# Patient Record
Sex: MALE | Race: OTHER | NOT HISPANIC OR LATINO
[De-identification: names, ages, dates, MRNs, and addresses within clinical notes are randomized per-mention and may not be internally consistent; named-entity substitution may affect disease eponyms.]

---

## 2018-07-10 ENCOUNTER — APPOINTMENT (OUTPATIENT)
Dept: OPHTHALMOLOGY | Facility: CLINIC | Age: 63
End: 2018-07-10
Payer: SELF-PAY

## 2018-07-10 PROBLEM — Z00.00 ENCOUNTER FOR PREVENTIVE HEALTH EXAMINATION: Status: ACTIVE | Noted: 2018-07-10

## 2018-07-10 PROCEDURE — 92002 INTRM OPH EXAM NEW PATIENT: CPT

## 2018-07-12 ENCOUNTER — APPOINTMENT (OUTPATIENT)
Dept: OPHTHALMOLOGY | Facility: CLINIC | Age: 63
End: 2018-07-12
Payer: SELF-PAY

## 2018-07-12 PROCEDURE — 92014 COMPRE OPH EXAM EST PT 1/>: CPT

## 2018-07-12 PROCEDURE — 92015 DETERMINE REFRACTIVE STATE: CPT

## 2018-09-06 ENCOUNTER — APPOINTMENT (OUTPATIENT)
Dept: OPHTHALMOLOGY | Facility: CLINIC | Age: 63
End: 2018-09-06
Payer: SELF-PAY

## 2018-09-06 PROCEDURE — 92015 DETERMINE REFRACTIVE STATE: CPT

## 2018-09-06 PROCEDURE — 92014 COMPRE OPH EXAM EST PT 1/>: CPT

## 2018-10-02 ENCOUNTER — APPOINTMENT (OUTPATIENT)
Dept: OPHTHALMOLOGY | Facility: CLINIC | Age: 63
End: 2018-10-02
Payer: COMMERCIAL

## 2018-10-02 PROCEDURE — 92014 COMPRE OPH EXAM EST PT 1/>: CPT

## 2018-10-02 PROCEDURE — 92025 CPTRIZED CORNEAL TOPOGRAPHY: CPT

## 2018-10-02 RX ORDER — VALSARTAN 40 MG/1
TABLET ORAL
Refills: 0 | Status: ACTIVE | COMMUNITY

## 2018-10-02 RX ORDER — MONTELUKAST SODIUM 10 MG/1
TABLET, FILM COATED ORAL
Refills: 0 | Status: ACTIVE | COMMUNITY

## 2018-10-22 ENCOUNTER — APPOINTMENT (OUTPATIENT)
Dept: OPHTHALMOLOGY | Facility: CLINIC | Age: 63
End: 2018-10-22
Payer: COMMERCIAL

## 2018-10-22 PROCEDURE — 92012 INTRM OPH EXAM EST PATIENT: CPT

## 2018-11-13 ENCOUNTER — MEDICATION RENEWAL (OUTPATIENT)
Age: 63
End: 2018-11-13

## 2018-11-19 ENCOUNTER — OUTPATIENT (OUTPATIENT)
Dept: OUTPATIENT SERVICES | Facility: HOSPITAL | Age: 63
LOS: 1 days | Discharge: HOME | End: 2018-11-19
Payer: COMMERCIAL

## 2018-11-19 ENCOUNTER — RESULT REVIEW (OUTPATIENT)
Age: 63
End: 2018-11-19

## 2018-11-19 ENCOUNTER — APPOINTMENT (OUTPATIENT)
Dept: OPHTHALMOLOGY | Facility: AMBULATORY SURGERY CENTER | Age: 63
End: 2018-11-19

## 2018-11-19 LAB — GLUCOSE BLDC GLUCOMTR-MCNC: 104 MG/DL — HIGH (ref 70–99)

## 2018-11-19 PROCEDURE — 65756 CORNEAL TRNSPL ENDOTHELIAL: CPT | Mod: RT

## 2018-11-19 PROCEDURE — 66984 XCAPSL CTRC RMVL W/O ECP: CPT | Mod: RT

## 2018-11-20 ENCOUNTER — APPOINTMENT (OUTPATIENT)
Dept: OPHTHALMOLOGY | Facility: CLINIC | Age: 63
End: 2018-11-20
Payer: COMMERCIAL

## 2018-11-20 LAB
GRAM STN FLD: SIGNIFICANT CHANGE UP
SPECIMEN SOURCE: SIGNIFICANT CHANGE UP

## 2018-11-20 PROCEDURE — 99024 POSTOP FOLLOW-UP VISIT: CPT

## 2018-11-22 LAB
CULTURE RESULTS: NO GROWTH — SIGNIFICANT CHANGE UP
SPECIMEN SOURCE: SIGNIFICANT CHANGE UP

## 2018-11-23 ENCOUNTER — APPOINTMENT (OUTPATIENT)
Dept: OPHTHALMOLOGY | Facility: CLINIC | Age: 63
End: 2018-11-23

## 2018-11-26 LAB — SURGICAL PATHOLOGY STUDY: SIGNIFICANT CHANGE UP

## 2018-11-28 ENCOUNTER — APPOINTMENT (OUTPATIENT)
Dept: OPHTHALMOLOGY | Facility: CLINIC | Age: 63
End: 2018-11-28
Payer: COMMERCIAL

## 2018-11-28 PROCEDURE — 99024 POSTOP FOLLOW-UP VISIT: CPT

## 2018-12-19 ENCOUNTER — APPOINTMENT (OUTPATIENT)
Dept: OPHTHALMOLOGY | Facility: CLINIC | Age: 63
End: 2018-12-19
Payer: COMMERCIAL

## 2018-12-19 PROCEDURE — 92136 OPHTHALMIC BIOMETRY: CPT | Mod: 26,LT

## 2018-12-19 PROCEDURE — 99024 POSTOP FOLLOW-UP VISIT: CPT

## 2019-01-29 ENCOUNTER — MEDICATION RENEWAL (OUTPATIENT)
Age: 64
End: 2019-01-29

## 2019-01-29 RX ORDER — MOXIFLOXACIN OPHTHALMIC 5 MG/ML
0.5 SOLUTION/ DROPS OPHTHALMIC 4 TIMES DAILY
Qty: 1 | Refills: 3 | Status: ACTIVE | COMMUNITY
Start: 2018-11-13 | End: 1900-01-01

## 2019-01-29 RX ORDER — MOXIFLOXACIN OPHTHALMIC 5 MG/ML
0.5 SOLUTION/ DROPS OPHTHALMIC 4 TIMES DAILY
Qty: 1 | Refills: 1 | Status: ACTIVE | COMMUNITY
Start: 2019-01-29 | End: 1900-01-01

## 2019-01-29 RX ORDER — DIFLUPREDNATE 0.5 MG/ML
0.05 EMULSION OPHTHALMIC
Qty: 1 | Refills: 6 | Status: ACTIVE | COMMUNITY
Start: 2019-01-29 | End: 1900-01-01

## 2019-01-29 RX ORDER — NEPAFENAC 3 MG/ML
0.3 SUSPENSION/ DROPS OPHTHALMIC DAILY
Qty: 1 | Refills: 3 | Status: ACTIVE | COMMUNITY
Start: 2019-01-29 | End: 1900-01-01

## 2019-01-29 RX ORDER — PREDNISOLONE ACETATE 10 MG/ML
1 SUSPENSION/ DROPS OPHTHALMIC 4 TIMES DAILY
Qty: 1 | Refills: 2 | Status: ACTIVE | COMMUNITY
Start: 2018-11-13 | End: 1900-01-01

## 2019-02-04 ENCOUNTER — RESULT REVIEW (OUTPATIENT)
Age: 64
End: 2019-02-04

## 2019-02-04 ENCOUNTER — OUTPATIENT (OUTPATIENT)
Dept: OUTPATIENT SERVICES | Facility: HOSPITAL | Age: 64
LOS: 1 days | Discharge: HOME | End: 2019-02-04
Payer: COMMERCIAL

## 2019-02-04 ENCOUNTER — APPOINTMENT (OUTPATIENT)
Dept: OPHTHALMOLOGY | Facility: AMBULATORY SURGERY CENTER | Age: 64
End: 2019-02-04

## 2019-02-04 LAB — GLUCOSE BLDC GLUCOMTR-MCNC: 142 MG/DL — HIGH (ref 70–99)

## 2019-02-04 PROCEDURE — 65756 CORNEAL TRNSPL ENDOTHELIAL: CPT | Mod: LT,79

## 2019-02-04 PROCEDURE — 66984 XCAPSL CTRC RMVL W/O ECP: CPT | Mod: LT,79

## 2019-02-05 ENCOUNTER — APPOINTMENT (OUTPATIENT)
Dept: OPHTHALMOLOGY | Facility: CLINIC | Age: 64
End: 2019-02-05
Payer: COMMERCIAL

## 2019-02-05 PROCEDURE — 99024 POSTOP FOLLOW-UP VISIT: CPT

## 2019-02-06 LAB
GRAM STN FLD: SIGNIFICANT CHANGE UP
SPECIMEN SOURCE: SIGNIFICANT CHANGE UP

## 2019-02-07 LAB
CULTURE RESULTS: NO GROWTH — SIGNIFICANT CHANGE UP
SPECIMEN SOURCE: SIGNIFICANT CHANGE UP

## 2019-02-08 LAB — SURGICAL PATHOLOGY STUDY: SIGNIFICANT CHANGE UP

## 2019-02-13 ENCOUNTER — APPOINTMENT (OUTPATIENT)
Dept: OPHTHALMOLOGY | Facility: CLINIC | Age: 64
End: 2019-02-13
Payer: COMMERCIAL

## 2019-02-13 PROCEDURE — 99024 POSTOP FOLLOW-UP VISIT: CPT

## 2019-02-14 ENCOUNTER — APPOINTMENT (OUTPATIENT)
Dept: OPHTHALMOLOGY | Facility: CLINIC | Age: 64
End: 2019-02-14

## 2019-02-20 ENCOUNTER — APPOINTMENT (OUTPATIENT)
Dept: OPHTHALMOLOGY | Facility: AMBULATORY SURGERY CENTER | Age: 64
End: 2019-02-20

## 2019-02-20 ENCOUNTER — OUTPATIENT (OUTPATIENT)
Dept: OUTPATIENT SERVICES | Facility: HOSPITAL | Age: 64
LOS: 1 days | Discharge: HOME | End: 2019-02-20
Payer: COMMERCIAL

## 2019-02-20 LAB — GLUCOSE BLDC GLUCOMTR-MCNC: 91 MG/DL — SIGNIFICANT CHANGE UP (ref 70–99)

## 2019-02-20 PROCEDURE — 66020 INJECTION TREATMENT OF EYE: CPT | Mod: LT,79

## 2019-02-21 ENCOUNTER — APPOINTMENT (OUTPATIENT)
Dept: OPHTHALMOLOGY | Facility: CLINIC | Age: 64
End: 2019-02-21
Payer: COMMERCIAL

## 2019-02-21 PROCEDURE — 99024 POSTOP FOLLOW-UP VISIT: CPT

## 2019-02-25 ENCOUNTER — APPOINTMENT (OUTPATIENT)
Dept: OPHTHALMOLOGY | Facility: CLINIC | Age: 64
End: 2019-02-25
Payer: COMMERCIAL

## 2019-02-25 DIAGNOSIS — H18.602 KERATOCONUS, UNSPECIFIED, LEFT EYE: ICD-10-CM

## 2019-02-25 PROCEDURE — 99024 POSTOP FOLLOW-UP VISIT: CPT

## 2019-02-27 PROBLEM — H18.602 KERATOCONUS OF LEFT EYE: Status: ACTIVE | Noted: 2018-07-12

## 2019-03-11 ENCOUNTER — APPOINTMENT (OUTPATIENT)
Dept: OPHTHALMOLOGY | Facility: CLINIC | Age: 64
End: 2019-03-11
Payer: COMMERCIAL

## 2019-03-11 DIAGNOSIS — H18.52 EPITHELIAL (JUVENILE) CORNEAL DYSTROPHY: ICD-10-CM

## 2019-03-11 DIAGNOSIS — H25.13 AGE-RELATED NUCLEAR CATARACT, BILATERAL: ICD-10-CM

## 2019-03-11 DIAGNOSIS — H18.603 KERATOCONUS, UNSPECIFIED, BILATERAL: ICD-10-CM

## 2019-03-11 DIAGNOSIS — H18.51 ENDOTHELIAL CORNEAL DYSTROPHY: ICD-10-CM

## 2019-03-11 PROCEDURE — 99024 POSTOP FOLLOW-UP VISIT: CPT

## 2021-01-11 ENCOUNTER — NON-APPOINTMENT (OUTPATIENT)
Age: 66
End: 2021-01-11

## 2021-01-11 ENCOUNTER — APPOINTMENT (OUTPATIENT)
Dept: OPHTHALMOLOGY | Facility: CLINIC | Age: 66
End: 2021-01-11
Payer: COMMERCIAL

## 2021-01-11 PROCEDURE — 99072 ADDL SUPL MATRL&STAF TM PHE: CPT

## 2021-01-11 PROCEDURE — 92014 COMPRE OPH EXAM EST PT 1/>: CPT

## 2021-01-11 PROCEDURE — 92025 CPTRIZED CORNEAL TOPOGRAPHY: CPT

## 2021-01-28 ENCOUNTER — APPOINTMENT (OUTPATIENT)
Dept: OPHTHALMOLOGY | Facility: CLINIC | Age: 66
End: 2021-01-28

## 2021-02-23 ENCOUNTER — TRANSCRIPTION ENCOUNTER (OUTPATIENT)
Age: 66
End: 2021-02-23

## 2021-02-24 ENCOUNTER — NON-APPOINTMENT (OUTPATIENT)
Age: 66
End: 2021-02-24

## 2021-02-24 ENCOUNTER — APPOINTMENT (OUTPATIENT)
Dept: OPHTHALMOLOGY | Facility: AMBULATORY SURGERY CENTER | Age: 66
End: 2021-02-24

## 2021-02-24 ENCOUNTER — RESULT REVIEW (OUTPATIENT)
Age: 66
End: 2021-02-24

## 2021-02-24 ENCOUNTER — OUTPATIENT (OUTPATIENT)
Dept: OUTPATIENT SERVICES | Facility: HOSPITAL | Age: 66
LOS: 1 days | Discharge: HOME | End: 2021-02-24
Payer: COMMERCIAL

## 2021-02-24 LAB — GLUCOSE BLDC GLUCOMTR-MCNC: 118 MG/DL — HIGH (ref 70–99)

## 2021-02-24 PROCEDURE — 88304 TISSUE EXAM BY PATHOLOGIST: CPT | Mod: 26

## 2021-02-24 PROCEDURE — 65756 CORNEAL TRNSPL ENDOTHELIAL: CPT | Mod: LT

## 2021-02-25 ENCOUNTER — APPOINTMENT (OUTPATIENT)
Dept: OPHTHALMOLOGY | Facility: CLINIC | Age: 66
End: 2021-02-25
Payer: COMMERCIAL

## 2021-02-25 ENCOUNTER — NON-APPOINTMENT (OUTPATIENT)
Age: 66
End: 2021-02-25

## 2021-02-25 LAB
GRAM STN FLD: SIGNIFICANT CHANGE UP
SPECIMEN SOURCE: SIGNIFICANT CHANGE UP

## 2021-02-25 PROCEDURE — 99024 POSTOP FOLLOW-UP VISIT: CPT

## 2021-03-01 ENCOUNTER — NON-APPOINTMENT (OUTPATIENT)
Age: 66
End: 2021-03-01

## 2021-03-01 ENCOUNTER — APPOINTMENT (OUTPATIENT)
Dept: OPHTHALMOLOGY | Facility: CLINIC | Age: 66
End: 2021-03-01
Payer: COMMERCIAL

## 2021-03-01 LAB — SURGICAL PATHOLOGY STUDY: SIGNIFICANT CHANGE UP

## 2021-03-01 PROCEDURE — 99024 POSTOP FOLLOW-UP VISIT: CPT

## 2021-03-02 LAB
CULTURE RESULTS: NO GROWTH — SIGNIFICANT CHANGE UP
SPECIMEN SOURCE: SIGNIFICANT CHANGE UP

## 2021-03-08 ENCOUNTER — NON-APPOINTMENT (OUTPATIENT)
Age: 66
End: 2021-03-08

## 2021-03-08 ENCOUNTER — APPOINTMENT (OUTPATIENT)
Dept: OPHTHALMOLOGY | Facility: CLINIC | Age: 66
End: 2021-03-08
Payer: COMMERCIAL

## 2021-03-08 PROCEDURE — 99024 POSTOP FOLLOW-UP VISIT: CPT

## 2021-04-12 ENCOUNTER — APPOINTMENT (OUTPATIENT)
Dept: OPHTHALMOLOGY | Facility: CLINIC | Age: 66
End: 2021-04-12
Payer: COMMERCIAL

## 2021-04-12 ENCOUNTER — NON-APPOINTMENT (OUTPATIENT)
Age: 66
End: 2021-04-12

## 2021-04-12 PROCEDURE — 99024 POSTOP FOLLOW-UP VISIT: CPT

## 2021-05-10 ENCOUNTER — APPOINTMENT (OUTPATIENT)
Dept: OPHTHALMOLOGY | Facility: CLINIC | Age: 66
End: 2021-05-10
Payer: COMMERCIAL

## 2021-05-10 ENCOUNTER — NON-APPOINTMENT (OUTPATIENT)
Age: 66
End: 2021-05-10

## 2021-05-10 PROCEDURE — 99024 POSTOP FOLLOW-UP VISIT: CPT

## 2021-06-14 ENCOUNTER — APPOINTMENT (OUTPATIENT)
Dept: OPHTHALMOLOGY | Facility: CLINIC | Age: 66
End: 2021-06-14
Payer: COMMERCIAL

## 2021-06-14 ENCOUNTER — NON-APPOINTMENT (OUTPATIENT)
Age: 66
End: 2021-06-14

## 2021-06-14 PROCEDURE — 92012 INTRM OPH EXAM EST PATIENT: CPT

## 2021-06-14 PROCEDURE — 99072 ADDL SUPL MATRL&STAF TM PHE: CPT

## 2021-07-01 ENCOUNTER — NON-APPOINTMENT (OUTPATIENT)
Age: 66
End: 2021-07-01

## 2021-07-01 ENCOUNTER — APPOINTMENT (OUTPATIENT)
Dept: OPHTHALMOLOGY | Facility: CLINIC | Age: 66
End: 2021-07-01
Payer: COMMERCIAL

## 2021-07-01 PROCEDURE — 92134 CPTRZ OPH DX IMG PST SGM RTA: CPT

## 2021-07-01 PROCEDURE — 92014 COMPRE OPH EXAM EST PT 1/>: CPT

## 2021-07-01 PROCEDURE — 99072 ADDL SUPL MATRL&STAF TM PHE: CPT

## 2021-08-04 ENCOUNTER — NON-APPOINTMENT (OUTPATIENT)
Age: 66
End: 2021-08-04

## 2021-08-04 ENCOUNTER — APPOINTMENT (OUTPATIENT)
Dept: OPHTHALMOLOGY | Facility: CLINIC | Age: 66
End: 2021-08-04
Payer: COMMERCIAL

## 2021-08-04 PROCEDURE — 92012 INTRM OPH EXAM EST PATIENT: CPT

## 2021-08-04 PROCEDURE — 92134 CPTRZ OPH DX IMG PST SGM RTA: CPT

## 2021-09-03 ENCOUNTER — APPOINTMENT (OUTPATIENT)
Dept: OPHTHALMOLOGY | Facility: CLINIC | Age: 66
End: 2021-09-03
Payer: COMMERCIAL

## 2021-09-03 ENCOUNTER — NON-APPOINTMENT (OUTPATIENT)
Age: 66
End: 2021-09-03

## 2021-09-03 PROCEDURE — 92134 CPTRZ OPH DX IMG PST SGM RTA: CPT

## 2021-09-03 PROCEDURE — 92012 INTRM OPH EXAM EST PATIENT: CPT

## 2021-10-04 ENCOUNTER — APPOINTMENT (OUTPATIENT)
Dept: OPHTHALMOLOGY | Facility: CLINIC | Age: 66
End: 2021-10-04
Payer: COMMERCIAL

## 2021-10-04 ENCOUNTER — NON-APPOINTMENT (OUTPATIENT)
Age: 66
End: 2021-10-04

## 2021-10-04 PROCEDURE — 92012 INTRM OPH EXAM EST PATIENT: CPT

## 2021-10-04 PROCEDURE — 92134 CPTRZ OPH DX IMG PST SGM RTA: CPT

## 2021-11-08 ENCOUNTER — APPOINTMENT (OUTPATIENT)
Dept: OPHTHALMOLOGY | Facility: CLINIC | Age: 66
End: 2021-11-08
Payer: COMMERCIAL

## 2021-11-08 ENCOUNTER — NON-APPOINTMENT (OUTPATIENT)
Age: 66
End: 2021-11-08

## 2021-11-08 PROCEDURE — 92025 CPTRIZED CORNEAL TOPOGRAPHY: CPT

## 2021-11-08 PROCEDURE — 92012 INTRM OPH EXAM EST PATIENT: CPT

## 2021-12-01 ENCOUNTER — NON-APPOINTMENT (OUTPATIENT)
Age: 66
End: 2021-12-01

## 2021-12-01 ENCOUNTER — APPOINTMENT (OUTPATIENT)
Dept: OPHTHALMOLOGY | Facility: CLINIC | Age: 66
End: 2021-12-01
Payer: COMMERCIAL

## 2021-12-01 PROCEDURE — 92134 CPTRZ OPH DX IMG PST SGM RTA: CPT

## 2021-12-01 PROCEDURE — 92012 INTRM OPH EXAM EST PATIENT: CPT

## 2022-01-19 ENCOUNTER — APPOINTMENT (OUTPATIENT)
Dept: OPHTHALMOLOGY | Facility: CLINIC | Age: 67
End: 2022-01-19
Payer: COMMERCIAL

## 2022-01-19 ENCOUNTER — NON-APPOINTMENT (OUTPATIENT)
Age: 67
End: 2022-01-19

## 2022-01-19 PROCEDURE — 92134 CPTRZ OPH DX IMG PST SGM RTA: CPT

## 2022-01-19 PROCEDURE — 92012 INTRM OPH EXAM EST PATIENT: CPT

## 2022-02-09 ENCOUNTER — NON-APPOINTMENT (OUTPATIENT)
Age: 67
End: 2022-02-09

## 2022-02-09 ENCOUNTER — APPOINTMENT (OUTPATIENT)
Dept: OPHTHALMOLOGY | Facility: CLINIC | Age: 67
End: 2022-02-09
Payer: COMMERCIAL

## 2022-02-09 PROCEDURE — 92012 INTRM OPH EXAM EST PATIENT: CPT

## 2022-04-20 ENCOUNTER — NON-APPOINTMENT (OUTPATIENT)
Age: 67
End: 2022-04-20

## 2022-04-20 ENCOUNTER — APPOINTMENT (OUTPATIENT)
Dept: OPHTHALMOLOGY | Facility: CLINIC | Age: 67
End: 2022-04-20
Payer: COMMERCIAL

## 2022-04-20 PROCEDURE — 92134 CPTRZ OPH DX IMG PST SGM RTA: CPT

## 2022-04-20 PROCEDURE — 92012 INTRM OPH EXAM EST PATIENT: CPT

## 2025-03-18 ENCOUNTER — EMERGENCY (EMERGENCY)
Facility: HOSPITAL | Age: 70
LOS: 1 days | Discharge: HOME | End: 2025-03-18
Attending: STUDENT IN AN ORGANIZED HEALTH CARE EDUCATION/TRAINING PROGRAM | Admitting: STUDENT IN AN ORGANIZED HEALTH CARE EDUCATION/TRAINING PROGRAM
Payer: COMMERCIAL

## 2025-03-18 VITALS
RESPIRATION RATE: 18 BRPM | SYSTOLIC BLOOD PRESSURE: 101 MMHG | OXYGEN SATURATION: 95 % | HEART RATE: 100 BPM | TEMPERATURE: 98 F | DIASTOLIC BLOOD PRESSURE: 63 MMHG

## 2025-03-18 VITALS
OXYGEN SATURATION: 96 % | WEIGHT: 199.08 LBS | SYSTOLIC BLOOD PRESSURE: 112 MMHG | TEMPERATURE: 98 F | DIASTOLIC BLOOD PRESSURE: 57 MMHG | HEIGHT: 67 IN | RESPIRATION RATE: 18 BRPM | HEART RATE: 110 BPM

## 2025-03-18 LAB
ADD ON TEST-SPECIMEN IN LAB: SIGNIFICANT CHANGE UP
ADD ON TEST-SPECIMEN IN LAB: SIGNIFICANT CHANGE UP
ALBUMIN SERPL ELPH-MCNC: 2.7 G/DL — LOW (ref 3.3–5)
ALBUMIN SERPL ELPH-MCNC: 3 G/DL — LOW (ref 3.3–5)
ALP SERPL-CCNC: 57 U/L — SIGNIFICANT CHANGE UP (ref 40–120)
ALP SERPL-CCNC: SIGNIFICANT CHANGE UP (ref 40–120)
ALT FLD-CCNC: 18 U/L — SIGNIFICANT CHANGE UP (ref 10–45)
ALT FLD-CCNC: SIGNIFICANT CHANGE UP (ref 10–45)
ANION GAP SERPL CALC-SCNC: 14 MMOL/L — SIGNIFICANT CHANGE UP (ref 5–17)
ANION GAP SERPL CALC-SCNC: 16 MMOL/L — SIGNIFICANT CHANGE UP (ref 5–17)
AST SERPL-CCNC: 16 U/L — SIGNIFICANT CHANGE UP (ref 10–40)
AST SERPL-CCNC: SIGNIFICANT CHANGE UP (ref 10–40)
BILIRUB SERPL-MCNC: 1 MG/DL — SIGNIFICANT CHANGE UP (ref 0.2–1.2)
BILIRUB SERPL-MCNC: 1 MG/DL — SIGNIFICANT CHANGE UP (ref 0.2–1.2)
BUN SERPL-MCNC: 16 MG/DL — SIGNIFICANT CHANGE UP (ref 7–23)
BUN SERPL-MCNC: 17 MG/DL — SIGNIFICANT CHANGE UP (ref 7–23)
C DIFF GDH STL QL: NEGATIVE — SIGNIFICANT CHANGE UP
C DIFF GDH STL QL: SIGNIFICANT CHANGE UP
CALCIUM SERPL-MCNC: 8.2 MG/DL — LOW (ref 8.4–10.5)
CALCIUM SERPL-MCNC: 8.3 MG/DL — LOW (ref 8.4–10.5)
CHLORIDE SERPL-SCNC: 102 MMOL/L — SIGNIFICANT CHANGE UP (ref 96–108)
CHLORIDE SERPL-SCNC: 98 MMOL/L — SIGNIFICANT CHANGE UP (ref 96–108)
CO2 SERPL-SCNC: 17 MMOL/L — LOW (ref 22–31)
CO2 SERPL-SCNC: 19 MMOL/L — LOW (ref 22–31)
CREAT SERPL-MCNC: 1.31 MG/DL — HIGH (ref 0.5–1.3)
CREAT SERPL-MCNC: 1.39 MG/DL — HIGH (ref 0.5–1.3)
EGFR: 54 ML/MIN/1.73M2 — LOW
EGFR: 54 ML/MIN/1.73M2 — LOW
EGFR: 58 ML/MIN/1.73M2 — LOW
EGFR: 58 ML/MIN/1.73M2 — LOW
GLUCOSE SERPL-MCNC: 87 MG/DL — SIGNIFICANT CHANGE UP (ref 70–99)
GLUCOSE SERPL-MCNC: 99 MG/DL — SIGNIFICANT CHANGE UP (ref 70–99)
HCT VFR BLD CALC: 48.4 % — SIGNIFICANT CHANGE UP (ref 39–50)
HGB BLD-MCNC: 17.7 G/DL — HIGH (ref 13–17)
LIDOCAIN IGE QN: 20 U/L — SIGNIFICANT CHANGE UP (ref 7–60)
MCHC RBC-ENTMCNC: 31.4 PG — SIGNIFICANT CHANGE UP (ref 27–34)
MCHC RBC-ENTMCNC: 36.6 G/DL — HIGH (ref 32–36)
MCV RBC AUTO: 85.8 FL — SIGNIFICANT CHANGE UP (ref 80–100)
NRBC BLD AUTO-RTO: 0 /100 WBCS — SIGNIFICANT CHANGE UP (ref 0–0)
PLATELET # BLD AUTO: 359 K/UL — SIGNIFICANT CHANGE UP (ref 150–400)
POTASSIUM SERPL-MCNC: 3 MMOL/L — LOW (ref 3.5–5.3)
POTASSIUM SERPL-MCNC: SIGNIFICANT CHANGE UP (ref 3.5–5.3)
POTASSIUM SERPL-SCNC: 3 MMOL/L — LOW (ref 3.5–5.3)
POTASSIUM SERPL-SCNC: SIGNIFICANT CHANGE UP (ref 3.5–5.3)
PROT SERPL-MCNC: 6.2 G/DL — SIGNIFICANT CHANGE UP (ref 6–8.3)
PROT SERPL-MCNC: 7.2 G/DL — SIGNIFICANT CHANGE UP (ref 6–8.3)
RBC # BLD: 5.64 M/UL — SIGNIFICANT CHANGE UP (ref 4.2–5.8)
RBC # FLD: 13.4 % — SIGNIFICANT CHANGE UP (ref 10.3–14.5)
SODIUM SERPL-SCNC: 131 MMOL/L — LOW (ref 135–145)
SODIUM SERPL-SCNC: 135 MMOL/L — SIGNIFICANT CHANGE UP (ref 135–145)
WBC # BLD: 19.24 K/UL — HIGH (ref 3.8–10.5)
WBC # FLD AUTO: 19.24 K/UL — HIGH (ref 3.8–10.5)

## 2025-03-18 PROCEDURE — 83690 ASSAY OF LIPASE: CPT

## 2025-03-18 PROCEDURE — 96374 THER/PROPH/DIAG INJ IV PUSH: CPT | Mod: XU

## 2025-03-18 PROCEDURE — 74177 CT ABD & PELVIS W/CONTRAST: CPT | Mod: 26

## 2025-03-18 PROCEDURE — 83735 ASSAY OF MAGNESIUM: CPT

## 2025-03-18 PROCEDURE — 36415 COLL VENOUS BLD VENIPUNCTURE: CPT

## 2025-03-18 PROCEDURE — 87449 NOS EACH ORGANISM AG IA: CPT

## 2025-03-18 PROCEDURE — 87507 IADNA-DNA/RNA PROBE TQ 12-25: CPT

## 2025-03-18 PROCEDURE — 74177 CT ABD & PELVIS W/CONTRAST: CPT | Mod: MC

## 2025-03-18 PROCEDURE — 80053 COMPREHEN METABOLIC PANEL: CPT

## 2025-03-18 PROCEDURE — 99284 EMERGENCY DEPT VISIT MOD MDM: CPT | Mod: 25

## 2025-03-18 PROCEDURE — 85027 COMPLETE CBC AUTOMATED: CPT

## 2025-03-18 PROCEDURE — 99285 EMERGENCY DEPT VISIT HI MDM: CPT

## 2025-03-18 PROCEDURE — 84100 ASSAY OF PHOSPHORUS: CPT

## 2025-03-18 PROCEDURE — 87324 CLOSTRIDIUM AG IA: CPT

## 2025-03-18 RX ORDER — OXYCODONE HYDROCHLORIDE AND ACETAMINOPHEN 10; 325 MG/1; MG/1
1 TABLET ORAL
Qty: 9 | Refills: 0
Start: 2025-03-18 | End: 2025-03-20

## 2025-03-18 RX ORDER — AMOXICILLIN AND CLAVULANATE POTASSIUM 500; 125 MG/1; MG/1
1 TABLET, FILM COATED ORAL ONCE
Refills: 0 | Status: COMPLETED | OUTPATIENT
Start: 2025-03-18 | End: 2025-03-18

## 2025-03-18 RX ORDER — IOHEXOL 350 MG/ML
30 INJECTION, SOLUTION INTRAVENOUS ONCE
Refills: 0 | Status: COMPLETED | OUTPATIENT
Start: 2025-03-18 | End: 2025-03-18

## 2025-03-18 RX ORDER — ACETAMINOPHEN 500 MG/5ML
650 LIQUID (ML) ORAL ONCE
Refills: 0 | Status: COMPLETED | OUTPATIENT
Start: 2025-03-18 | End: 2025-03-18

## 2025-03-18 RX ORDER — AMOXICILLIN AND CLAVULANATE POTASSIUM 500; 125 MG/1; MG/1
1 TABLET, FILM COATED ORAL
Qty: 14 | Refills: 0
Start: 2025-03-18 | End: 2025-03-24

## 2025-03-18 RX ORDER — SODIUM CHLORIDE 9 G/1000ML
1000 INJECTION, SOLUTION INTRAVENOUS ONCE
Refills: 0 | Status: COMPLETED | OUTPATIENT
Start: 2025-03-18 | End: 2025-03-18

## 2025-03-18 RX ORDER — POTASSIUM CHLORIDE, DEXTROSE MONOHYDRATE AND SODIUM CHLORIDE 150; 5; 900 MG/100ML; G/100ML; MG/100ML
1010 INJECTION, SOLUTION INTRAVENOUS
Refills: 0 | Status: COMPLETED | OUTPATIENT
Start: 2025-03-18 | End: 2025-03-18

## 2025-03-18 RX ADMIN — Medication 650 MILLIGRAM(S): at 23:07

## 2025-03-18 RX ADMIN — Medication 4 MILLIGRAM(S): at 23:08

## 2025-03-18 RX ADMIN — POTASSIUM CHLORIDE, DEXTROSE MONOHYDRATE AND SODIUM CHLORIDE 1000 MILLILITER(S): 150; 5; 900 INJECTION, SOLUTION INTRAVENOUS at 22:22

## 2025-03-18 RX ADMIN — SODIUM CHLORIDE 1000 MILLILITER(S): 9 INJECTION, SOLUTION INTRAVENOUS at 17:23

## 2025-03-18 RX ADMIN — Medication 40 MILLIEQUIVALENT(S): at 22:08

## 2025-03-18 RX ADMIN — AMOXICILLIN AND CLAVULANATE POTASSIUM 1 TABLET(S): 500; 125 TABLET, FILM COATED ORAL at 23:07

## 2025-03-18 RX ADMIN — SODIUM CHLORIDE 1000 MILLILITER(S): 9 INJECTION, SOLUTION INTRAVENOUS at 18:50

## 2025-03-18 RX ADMIN — IOHEXOL 30 MILLILITER(S): 350 INJECTION, SOLUTION INTRAVENOUS at 17:23

## 2025-03-18 NOTE — ED PROVIDER NOTE - NS ED ROS FT
CONSTITUTIONAL: No fever, no chills, no fatigue  EYES: No eye redness, no visual changes  ENT: No ear pain, no sore throat  CARDIOVASCULAR: No chest pain, no palpitations  RESPIRATORY: No cough, no SOB  GI: No abdominal pain, no nausea, no vomiting, no constipation, +diarrhea  GENITOURINARY: No dysuria, no frequency, no hematuria  MUSCULOSKELETAL: No back pain, no joint pain, no myalgias  SKIN: No rash, no peripheral edema  NEURO: No headache, no confusion    ALL OTHER SYSTEMS NEGATIVE Doxycycline Counseling:  Patient counseled regarding possible photosensitivity and increased risk for sunburn.  Patient instructed to avoid sunlight, if possible.  When exposed to sunlight, patients should wear protective clothing, sunglasses, and sunscreen.  The patient was instructed to call the office immediately if the following severe adverse effects occur:  hearing changes, easy bruising/bleeding, severe headache, or vision changes.  The patient verbalized understanding of the proper use and possible adverse effects of doxycycline.  All of the patient's questions and concerns were addressed.

## 2025-03-18 NOTE — ED PROVIDER NOTE - CLINICAL SUMMARY MEDICAL DECISION MAKING FREE TEXT BOX
Diarrhea for 2 wks. No fever, abx use, sick contacts or travel.  Now mildly bloody, likely 2/2 rectal irritation from frequent BMs.  No large vol blood.  Will eval labs, perform CT r/o functional cause of diarrhea.  GI PCR.  IVF rehydration.    Pt overall well appearing and likely will be able to go home if w/u unremarkable.

## 2025-03-18 NOTE — ED PROVIDER NOTE - PROGRESS NOTE DETAILS
WBC 19. CT results show significant inflammatory vs infectious colitis of entire colon.  I spoke to Dr. Early on the phone who states pt did not have c.diff performed as outpt study.  Will collect stool sample and perform.  Plan for possible abx for c.diff vs other infectious colitis.  Pt has colonoscopy scheduled for 1 week from now with Dr. Early.

## 2025-03-18 NOTE — ED PROVIDER NOTE - PATIENT PORTAL LINK FT
You can access the FollowMyHealth Patient Portal offered by [facility] by registering at the following website: [email or web address]. By joining [facility]’s FollowMyHealth portal, you will also be able to view your health information using other applications (apps) compatible with our system.

## 2025-03-18 NOTE — ED PROVIDER NOTE - PHYSICAL EXAMINATION
CONSTITUTIONAL: Non-toxic; in no apparent distress  HEAD: Normocephalic; atraumatic  EYES: PERRL; EOM intact   ENMT: External appears normal  NECK: Supple; non-tender  CARD: Normal S1, S2; no murmurs, rubs, or gallops  RESP: Normal chest excursion with respiration; breath sounds clear and equal bilaterally  ABD: Soft, non-distended; non-tender, no guarding, no rebound, no CVAT  EXT: Normal ROM in all four extremities; non-tender to palpation, no peripheral edema  SKIN: Warm, dry, no rash  NEURO:  No focal neurological deficiencies

## 2025-03-18 NOTE — ED PROVIDER NOTE - CARE PROVIDER_API CALL
Satish Early  Gastroenterology  [address], [address]  [place] [address]  Phone: [phone]  Fax: [phone]  Follow Up Time: 4-6 Days

## 2025-03-18 NOTE — ED PROVIDER NOTE - NSFOLLOWUPINSTRUCTIONS_ED_ALL_ED_FT
Follow up with your primary medical doctor as soon as possible.    Return to the emergency department if your symptoms worsen or if you develop new symptoms.  If you have any problems with followup, please call the ED Referral Coordinator at [phone].    Diarrhea    Diarrhea is frequent loose or watery bowel movements that has many causes. Diarrhea can make you feel weak and cause you to become dehydrated. Diarrhea typically lasts 2–3 days, but can last longer if it is a sign of something more serious. Drink clear fluids to prevent dehydration. Eat bland, easy-to-digest foods as tolerated.     SEEK IMMEDIATE MEDICAL CARE IF YOU HAVE ANY OF THE FOLLOWING SYMPTOMS: high fevers, lightheadedness/dizziness, chest pain, black or bloody stools, shortness of breath, severe abdominal or back pain, or any signs of dehydration. Follow up with your primary medical doctor as soon as possible.    Follow up with Dr. Early as soon as possible. Call [phone] to schedule an appointment.    Return to the emergency department if your symptoms worsen or if you develop new symptoms.  If you have any problems with followup, please call the ED Referral Coordinator at [phone].    Take medication for pain: Tylenol 650 mg every 6 hours. If Tylenol is not controlling pain, take 1 tab of percocet every 8 hours as needed.    Diarrhea    Diarrhea is frequent loose or watery bowel movements that has many causes. Diarrhea can make you feel weak and cause you to become dehydrated. Diarrhea typically lasts 2–3 days, but can last longer if it is a sign of something more serious. Drink clear fluids to prevent dehydration. Eat bland, easy-to-digest foods as tolerated.     SEEK IMMEDIATE MEDICAL CARE IF YOU HAVE ANY OF THE FOLLOWING SYMPTOMS: high fevers, lightheadedness/dizziness, chest pain, black or bloody stools, shortness of breath, severe abdominal or back pain, or any signs of dehydration.

## 2025-03-18 NOTE — ED PROVIDER NOTE - OBJECTIVE STATEMENT
70 yo M w PMH of DM2, p/w watery diarrhea for 2 weeks. He was seen by his GI physician who recommended he come into the ED. No recent travel or Abx use. He had a GI PCR yesterday that resulted negative. His GI is planning to perform a colonoscopy but wanted him to come in to ED for c/f dehydration. Recently, he has had scant blood in his stool and rectal discomfort with BMs.

## 2025-03-18 NOTE — ED ADULT NURSE NOTE - OBJECTIVE STATEMENT
Patient to the ED c/o blood diarrhea x 3 weeks. Denies abdominal pain, n/v, fevers or recent travel. No recent abx use. Recently had labs and stool sample done for pre-op r/t ortho surgery, stool negative. Hx of HTN and DM, on mounjaro. AAOX4, NAD.

## 2025-03-19 LAB
ADV 40+41 DNA STL QL NAA+NON-PROBE: SIGNIFICANT CHANGE UP
ASTROVIRUS: SIGNIFICANT CHANGE UP
C CAYETANENSIS DNA STL QL NAA+NON-PROBE: SIGNIFICANT CHANGE UP
CAMPYLOBACTER DNA SPEC NAA+PROBE: SIGNIFICANT CHANGE UP
CRYPTOSP DNA STL QL NAA+PROBE: SIGNIFICANT CHANGE UP
E COLI SXT SPEC: SIGNIFICANT CHANGE UP
E HISTOLYT DNA STL QL NAA+NON-PROBE: SIGNIFICANT CHANGE UP
EC EAEA GENE STL QL NAA+PROBE: SIGNIFICANT CHANGE UP
EPEC DNA STL QL NAA+PROBE: SIGNIFICANT CHANGE UP
ETEC DNA STL QL NAA+PROBE: SIGNIFICANT CHANGE UP
G LAMBLIA DNA STL QL NAA+NON-PROBE: SIGNIFICANT CHANGE UP
GI PCR PANEL: SIGNIFICANT CHANGE UP
NOROVIRUS GI+II RNA STL QL NAA+NON-PROBE: SIGNIFICANT CHANGE UP
P SHIGELLOIDES DNA STL QL NAA+NON-PROBE: SIGNIFICANT CHANGE UP
RV RNA STL NAA+PROBE: SIGNIFICANT CHANGE UP
SALMONELLA DNA STL QL NAA+PROBE: SIGNIFICANT CHANGE UP
SAPOVIRUS (GENOGROUPS I, II, IV, AND V): SIGNIFICANT CHANGE UP
SHIGELLA DNA SPEC QL NAA+PROBE: SIGNIFICANT CHANGE UP
VIBRIO CHOL TOXIN CTXA STL QL NAA+PROBE: SIGNIFICANT CHANGE UP
VIBRIO CHOL TOXIN CTXA STL QL NAA+PROBE: SIGNIFICANT CHANGE UP
Y ENTEROCOL DNA STL QL NAA+NON-PROBE: SIGNIFICANT CHANGE UP

## 2025-03-21 ENCOUNTER — INPATIENT (INPATIENT)
Facility: HOSPITAL | Age: 70
LOS: 2 days | Discharge: HOME | End: 2025-03-24
Attending: STUDENT IN AN ORGANIZED HEALTH CARE EDUCATION/TRAINING PROGRAM | Admitting: STUDENT IN AN ORGANIZED HEALTH CARE EDUCATION/TRAINING PROGRAM
Payer: COMMERCIAL

## 2025-03-21 VITALS
HEIGHT: 67 IN | WEIGHT: 199.96 LBS | OXYGEN SATURATION: 96 % | HEART RATE: 110 BPM | DIASTOLIC BLOOD PRESSURE: 46 MMHG | TEMPERATURE: 99 F | SYSTOLIC BLOOD PRESSURE: 83 MMHG | RESPIRATION RATE: 18 BRPM

## 2025-03-21 DIAGNOSIS — E11.9 TYPE 2 DIABETES MELLITUS WITHOUT COMPLICATIONS: ICD-10-CM

## 2025-03-21 DIAGNOSIS — R19.7 DIARRHEA, UNSPECIFIED: ICD-10-CM

## 2025-03-21 LAB
ADD ON TEST-SPECIMEN IN LAB: SIGNIFICANT CHANGE UP
ALBUMIN SERPL ELPH-MCNC: 2.6 G/DL — LOW (ref 3.3–5)
ALP SERPL-CCNC: 71 U/L — SIGNIFICANT CHANGE UP (ref 40–120)
ALT FLD-CCNC: 15 U/L — SIGNIFICANT CHANGE UP (ref 10–45)
ANION GAP SERPL CALC-SCNC: 10 MMOL/L — SIGNIFICANT CHANGE UP (ref 5–17)
ANION GAP SERPL CALC-SCNC: 11 MMOL/L — SIGNIFICANT CHANGE UP (ref 5–17)
ANION GAP SERPL CALC-SCNC: 13 MMOL/L — SIGNIFICANT CHANGE UP (ref 5–17)
APPEARANCE UR: CLEAR — SIGNIFICANT CHANGE UP
APTT BLD: 26.7 SEC — SIGNIFICANT CHANGE UP (ref 24.5–35.6)
APTT BLD: 27.6 SEC — SIGNIFICANT CHANGE UP (ref 24.5–35.6)
AST SERPL-CCNC: 13 U/L — SIGNIFICANT CHANGE UP (ref 10–40)
BASOPHILS # BLD AUTO: 0 K/UL — SIGNIFICANT CHANGE UP (ref 0–0.2)
BASOPHILS NFR BLD AUTO: 0 % — SIGNIFICANT CHANGE UP (ref 0–2)
BILIRUB SERPL-MCNC: 1 MG/DL — SIGNIFICANT CHANGE UP (ref 0.2–1.2)
BILIRUB UR-MCNC: NEGATIVE — SIGNIFICANT CHANGE UP
BUN SERPL-MCNC: 21 MG/DL — SIGNIFICANT CHANGE UP (ref 7–23)
BUN SERPL-MCNC: 22 MG/DL — SIGNIFICANT CHANGE UP (ref 7–23)
BUN SERPL-MCNC: 22 MG/DL — SIGNIFICANT CHANGE UP (ref 7–23)
CALCIUM SERPL-MCNC: 7.2 MG/DL — LOW (ref 8.4–10.5)
CALCIUM SERPL-MCNC: 7.4 MG/DL — LOW (ref 8.4–10.5)
CALCIUM SERPL-MCNC: 8.3 MG/DL — LOW (ref 8.4–10.5)
CHLORIDE SERPL-SCNC: 100 MMOL/L — SIGNIFICANT CHANGE UP (ref 96–108)
CHLORIDE SERPL-SCNC: 104 MMOL/L — SIGNIFICANT CHANGE UP (ref 96–108)
CHLORIDE SERPL-SCNC: 106 MMOL/L — SIGNIFICANT CHANGE UP (ref 96–108)
CO2 SERPL-SCNC: 16 MMOL/L — LOW (ref 22–31)
CO2 SERPL-SCNC: 16 MMOL/L — LOW (ref 22–31)
CO2 SERPL-SCNC: 20 MMOL/L — LOW (ref 22–31)
COLOR SPEC: SIGNIFICANT CHANGE UP
CREAT SERPL-MCNC: 1.48 MG/DL — HIGH (ref 0.5–1.3)
CREAT SERPL-MCNC: 1.53 MG/DL — HIGH (ref 0.5–1.3)
CREAT SERPL-MCNC: 1.8 MG/DL — HIGH (ref 0.5–1.3)
DIFF PNL FLD: ABNORMAL
EGFR: 40 ML/MIN/1.73M2 — LOW
EGFR: 40 ML/MIN/1.73M2 — LOW
EGFR: 48 ML/MIN/1.73M2 — LOW
EGFR: 48 ML/MIN/1.73M2 — LOW
EGFR: 50 ML/MIN/1.73M2 — LOW
EGFR: 50 ML/MIN/1.73M2 — LOW
EOSINOPHIL # BLD AUTO: 0 K/UL — SIGNIFICANT CHANGE UP (ref 0–0.5)
EOSINOPHIL NFR BLD AUTO: 0 % — SIGNIFICANT CHANGE UP (ref 0–6)
FLUAV AG NPH QL: SIGNIFICANT CHANGE UP
FLUBV AG NPH QL: SIGNIFICANT CHANGE UP
GLUCOSE SERPL-MCNC: 137 MG/DL — HIGH (ref 70–99)
GLUCOSE SERPL-MCNC: 88 MG/DL — SIGNIFICANT CHANGE UP (ref 70–99)
GLUCOSE SERPL-MCNC: 98 MG/DL — SIGNIFICANT CHANGE UP (ref 70–99)
GLUCOSE UR QL: NEGATIVE MG/DL — SIGNIFICANT CHANGE UP
HCT VFR BLD CALC: 32.6 % — LOW (ref 39–50)
HCT VFR BLD CALC: SIGNIFICANT CHANGE UP % (ref 39–50)
HGB BLD-MCNC: 12.1 G/DL — LOW (ref 13–17)
HGB BLD-MCNC: 14.6 G/DL — SIGNIFICANT CHANGE UP (ref 13–17)
INR BLD: 1.5 — HIGH (ref 0.85–1.16)
INR BLD: 1.52 — HIGH (ref 0.85–1.16)
KETONES UR-MCNC: ABNORMAL MG/DL
LACTATE SERPL-SCNC: 1.4 MMOL/L — SIGNIFICANT CHANGE UP (ref 0.5–2)
LACTATE SERPL-SCNC: 1.6 MMOL/L — SIGNIFICANT CHANGE UP (ref 0.5–2)
LEUKOCYTE ESTERASE UR-ACNC: NEGATIVE — SIGNIFICANT CHANGE UP
LYMPHOCYTES # BLD AUTO: 1.19 K/UL — SIGNIFICANT CHANGE UP (ref 1–3.3)
LYMPHOCYTES # BLD AUTO: 5.5 % — LOW (ref 13–44)
MAGNESIUM SERPL-MCNC: 1.6 MG/DL — SIGNIFICANT CHANGE UP (ref 1.6–2.6)
MCHC RBC-ENTMCNC: 31.3 PG — SIGNIFICANT CHANGE UP (ref 27–34)
MCHC RBC-ENTMCNC: 37.1 G/DL — HIGH (ref 32–36)
MCHC RBC-ENTMCNC: SIGNIFICANT CHANGE UP G/DL (ref 32–36)
MCHC RBC-ENTMCNC: SIGNIFICANT CHANGE UP PG (ref 27–34)
MCV RBC AUTO: 84.5 FL — SIGNIFICANT CHANGE UP (ref 80–100)
MCV RBC AUTO: SIGNIFICANT CHANGE UP FL (ref 80–100)
MONOCYTES # BLD AUTO: 2.62 K/UL — HIGH (ref 0–0.9)
MONOCYTES NFR BLD AUTO: 12.1 % — SIGNIFICANT CHANGE UP (ref 2–14)
NEUTROPHILS # BLD AUTO: 17.4 K/UL — HIGH (ref 1.8–7.4)
NEUTROPHILS NFR BLD AUTO: 78 % — HIGH (ref 43–77)
NITRITE UR-MCNC: NEGATIVE — SIGNIFICANT CHANGE UP
NRBC BLD AUTO-RTO: 0 /100 WBCS — SIGNIFICANT CHANGE UP (ref 0–0)
PH UR: 5.5 — SIGNIFICANT CHANGE UP (ref 5–8)
PHOSPHATE SERPL-MCNC: 1.6 MG/DL — LOW (ref 2.5–4.5)
PLATELET # BLD AUTO: 339 K/UL — SIGNIFICANT CHANGE UP (ref 150–400)
PLATELET # BLD AUTO: 422 K/UL — HIGH (ref 150–400)
POTASSIUM SERPL-MCNC: 2.8 MMOL/L — CRITICAL LOW (ref 3.5–5.3)
POTASSIUM SERPL-MCNC: 3 MMOL/L — LOW (ref 3.5–5.3)
POTASSIUM SERPL-MCNC: 3.2 MMOL/L — LOW (ref 3.5–5.3)
POTASSIUM SERPL-SCNC: 2.8 MMOL/L — CRITICAL LOW (ref 3.5–5.3)
POTASSIUM SERPL-SCNC: 3 MMOL/L — LOW (ref 3.5–5.3)
POTASSIUM SERPL-SCNC: 3.2 MMOL/L — LOW (ref 3.5–5.3)
PROT SERPL-MCNC: 6.2 G/DL — SIGNIFICANT CHANGE UP (ref 6–8.3)
PROT UR-MCNC: 100 MG/DL
PROTHROM AB SERPL-ACNC: 17.2 SEC — HIGH (ref 9.9–13.4)
PROTHROM AB SERPL-ACNC: 17.4 SEC — HIGH (ref 9.9–13.4)
RBC # BLD: 3.86 M/UL — LOW (ref 4.2–5.8)
RBC # BLD: SIGNIFICANT CHANGE UP M/UL (ref 4.2–5.8)
RBC # FLD: 14.5 % — SIGNIFICANT CHANGE UP (ref 10.3–14.5)
RBC # FLD: SIGNIFICANT CHANGE UP % (ref 10.3–14.5)
RSV RNA NPH QL NAA+NON-PROBE: SIGNIFICANT CHANGE UP
SARS-COV-2 RNA SPEC QL NAA+PROBE: SIGNIFICANT CHANGE UP
SODIUM SERPL-SCNC: 131 MMOL/L — LOW (ref 135–145)
SODIUM SERPL-SCNC: 132 MMOL/L — LOW (ref 135–145)
SODIUM SERPL-SCNC: 133 MMOL/L — LOW (ref 135–145)
SOURCE RESPIRATORY: SIGNIFICANT CHANGE UP
SP GR SPEC: 1.02 — SIGNIFICANT CHANGE UP (ref 1–1.03)
UROBILINOGEN FLD QL: 1 MG/DL — SIGNIFICANT CHANGE UP (ref 0.2–1)
WBC # BLD: 19.86 K/UL — HIGH (ref 3.8–10.5)
WBC # BLD: 21.69 K/UL — HIGH (ref 3.8–10.5)
WBC # FLD AUTO: 19.86 K/UL — HIGH (ref 3.8–10.5)
WBC # FLD AUTO: 21.69 K/UL — HIGH (ref 3.8–10.5)

## 2025-03-21 PROCEDURE — 74176 CT ABD & PELVIS W/O CONTRAST: CPT | Mod: 26,59

## 2025-03-21 PROCEDURE — 99291 CRITICAL CARE FIRST HOUR: CPT | Mod: GC

## 2025-03-21 PROCEDURE — 93010 ELECTROCARDIOGRAM REPORT: CPT

## 2025-03-21 PROCEDURE — 99223 1ST HOSP IP/OBS HIGH 75: CPT

## 2025-03-21 PROCEDURE — 74174 CTA ABD&PLVS W/CONTRAST: CPT | Mod: 26

## 2025-03-21 PROCEDURE — 99291 CRITICAL CARE FIRST HOUR: CPT

## 2025-03-21 RX ORDER — TAMSULOSIN HYDROCHLORIDE 0.4 MG/1
1 CAPSULE ORAL
Refills: 0 | DISCHARGE

## 2025-03-21 RX ORDER — IOHEXOL 350 MG/ML
30 INJECTION, SOLUTION INTRAVENOUS ONCE
Refills: 0 | Status: COMPLETED | OUTPATIENT
Start: 2025-03-21 | End: 2025-03-21

## 2025-03-21 RX ORDER — SODIUM CHLORIDE 9 G/1000ML
1000 INJECTION, SOLUTION INTRAVENOUS
Refills: 0 | Status: DISCONTINUED | OUTPATIENT
Start: 2025-03-21 | End: 2025-03-22

## 2025-03-21 RX ORDER — KETOROLAC TROMETHAMINE 30 MG/ML
15 INJECTION, SOLUTION INTRAMUSCULAR; INTRAVENOUS ONCE
Refills: 0 | Status: DISCONTINUED | OUTPATIENT
Start: 2025-03-21 | End: 2025-03-21

## 2025-03-21 RX ORDER — TIRZEPATIDE 7.5 MG/.5ML
15 INJECTION, SOLUTION SUBCUTANEOUS
Refills: 0 | DISCHARGE

## 2025-03-21 RX ORDER — VANCOMYCIN HCL IN 5 % DEXTROSE 1.5G/250ML
125 PLASTIC BAG, INJECTION (ML) INTRAVENOUS ONCE
Refills: 0 | Status: COMPLETED | OUTPATIENT
Start: 2025-03-21 | End: 2025-03-21

## 2025-03-21 RX ORDER — OLMESARTAN MEDOXOMIL 5 MG/1
2 TABLET, FILM COATED ORAL
Refills: 0 | DISCHARGE

## 2025-03-21 RX ORDER — HEPARIN SODIUM 1000 [USP'U]/ML
5000 INJECTION INTRAVENOUS; SUBCUTANEOUS EVERY 8 HOURS
Refills: 0 | Status: DISCONTINUED | OUTPATIENT
Start: 2025-03-21 | End: 2025-03-22

## 2025-03-21 RX ORDER — CEFTRIAXONE 500 MG/1
2000 INJECTION, POWDER, FOR SOLUTION INTRAMUSCULAR; INTRAVENOUS ONCE
Refills: 0 | Status: COMPLETED | OUTPATIENT
Start: 2025-03-21 | End: 2025-03-21

## 2025-03-21 RX ORDER — TESTOSTERONE 5.5 MG/1
60 GEL NASAL
Refills: 0 | DISCHARGE

## 2025-03-21 RX ORDER — DEXTROSE 50 % IN WATER 50 %
12.5 SYRINGE (ML) INTRAVENOUS ONCE
Refills: 0 | Status: DISCONTINUED | OUTPATIENT
Start: 2025-03-21 | End: 2025-03-22

## 2025-03-21 RX ORDER — ANASTROZOLE 1 MG/1
0.5 TABLET ORAL
Refills: 0 | DISCHARGE

## 2025-03-21 RX ORDER — METRONIDAZOLE 250 MG
500 TABLET ORAL EVERY 8 HOURS
Refills: 0 | Status: DISCONTINUED | OUTPATIENT
Start: 2025-03-21 | End: 2025-03-22

## 2025-03-21 RX ORDER — INSULIN LISPRO 100 U/ML
INJECTION, SOLUTION INTRAVENOUS; SUBCUTANEOUS
Refills: 0 | Status: DISCONTINUED | OUTPATIENT
Start: 2025-03-21 | End: 2025-03-24

## 2025-03-21 RX ORDER — DEXTROSE 50 % IN WATER 50 %
25 SYRINGE (ML) INTRAVENOUS ONCE
Refills: 0 | Status: DISCONTINUED | OUTPATIENT
Start: 2025-03-21 | End: 2025-03-22

## 2025-03-21 RX ORDER — ROSUVASTATIN CALCIUM 5 MG/1
5 TABLET, FILM COATED ORAL
Refills: 0 | DISCHARGE

## 2025-03-21 RX ORDER — SODIUM CHLORIDE 9 G/1000ML
1000 INJECTION, SOLUTION INTRAVENOUS
Refills: 0 | Status: DISCONTINUED | OUTPATIENT
Start: 2025-03-21 | End: 2025-03-23

## 2025-03-21 RX ORDER — ONDANSETRON HCL/PF 4 MG/2 ML
4 VIAL (ML) INJECTION EVERY 6 HOURS
Refills: 0 | Status: DISCONTINUED | OUTPATIENT
Start: 2025-03-21 | End: 2025-03-24

## 2025-03-21 RX ORDER — VANCOMYCIN HCL IN 5 % DEXTROSE 1.5G/250ML
125 PLASTIC BAG, INJECTION (ML) INTRAVENOUS EVERY 6 HOURS
Refills: 0 | Status: DISCONTINUED | OUTPATIENT
Start: 2025-03-21 | End: 2025-03-22

## 2025-03-21 RX ORDER — SODIUM CHLORIDE 9 G/1000ML
1000 INJECTION, SOLUTION INTRAVENOUS
Refills: 0 | Status: DISCONTINUED | OUTPATIENT
Start: 2025-03-21 | End: 2025-03-24

## 2025-03-21 RX ORDER — DEXTROSE 50 % IN WATER 50 %
15 SYRINGE (ML) INTRAVENOUS ONCE
Refills: 0 | Status: DISCONTINUED | OUTPATIENT
Start: 2025-03-21 | End: 2025-03-22

## 2025-03-21 RX ORDER — ONDANSETRON HCL/PF 4 MG/2 ML
4 VIAL (ML) INJECTION ONCE
Refills: 0 | Status: COMPLETED | OUTPATIENT
Start: 2025-03-21 | End: 2025-03-21

## 2025-03-21 RX ORDER — GLUCAGON 3 MG/1
1 POWDER NASAL ONCE
Refills: 0 | Status: DISCONTINUED | OUTPATIENT
Start: 2025-03-21 | End: 2025-03-22

## 2025-03-21 RX ORDER — DEXTROSE 50 % IN WATER 50 %
25 SYRINGE (ML) INTRAVENOUS ONCE
Refills: 0 | Status: DISCONTINUED | OUTPATIENT
Start: 2025-03-21 | End: 2025-03-24

## 2025-03-21 RX ORDER — METRONIDAZOLE 250 MG
500 TABLET ORAL ONCE
Refills: 0 | Status: COMPLETED | OUTPATIENT
Start: 2025-03-21 | End: 2025-03-21

## 2025-03-21 RX ADMIN — Medication 1000 MILLILITER(S): at 14:12

## 2025-03-21 RX ADMIN — Medication 10 MILLIEQUIVALENT(S): at 18:12

## 2025-03-21 RX ADMIN — Medication 2000 MILLILITER(S): at 13:39

## 2025-03-21 RX ADMIN — Medication 500 MILLIGRAM(S): at 16:12

## 2025-03-21 RX ADMIN — KETOROLAC TROMETHAMINE 15 MILLIGRAM(S): 30 INJECTION, SOLUTION INTRAMUSCULAR; INTRAVENOUS at 12:46

## 2025-03-21 RX ADMIN — Medication 125 MILLIGRAM(S): at 23:04

## 2025-03-21 RX ADMIN — Medication 1000 MILLILITER(S): at 15:41

## 2025-03-21 RX ADMIN — Medication 40 MILLIEQUIVALENT(S): at 17:50

## 2025-03-21 RX ADMIN — Medication 4 MILLIGRAM(S): at 12:36

## 2025-03-21 RX ADMIN — Medication 1000 MILLILITER(S): at 17:50

## 2025-03-21 RX ADMIN — Medication 1000 MILLILITER(S): at 12:43

## 2025-03-21 RX ADMIN — Medication 1000 MILLILITER(S): at 14:13

## 2025-03-21 RX ADMIN — Medication 20 MILLIGRAM(S): at 12:40

## 2025-03-21 RX ADMIN — Medication 100 MILLIGRAM(S): at 22:20

## 2025-03-21 RX ADMIN — Medication 100 MILLIEQUIVALENT(S): at 15:40

## 2025-03-21 RX ADMIN — IOHEXOL 30 MILLILITER(S): 350 INJECTION, SOLUTION INTRAVENOUS at 12:46

## 2025-03-21 RX ADMIN — Medication 2000 MILLILITER(S): at 12:19

## 2025-03-21 RX ADMIN — Medication 100 MILLIEQUIVALENT(S): at 13:29

## 2025-03-21 RX ADMIN — Medication 10 MILLIEQUIVALENT(S): at 14:49

## 2025-03-21 RX ADMIN — Medication 100 MILLIGRAM(S): at 15:40

## 2025-03-21 RX ADMIN — Medication 40 MILLIEQUIVALENT(S): at 13:31

## 2025-03-21 RX ADMIN — CEFTRIAXONE 100 MILLIGRAM(S): 500 INJECTION, POWDER, FOR SOLUTION INTRAMUSCULAR; INTRAVENOUS at 16:12

## 2025-03-21 RX ADMIN — SODIUM CHLORIDE 120 MILLILITER(S): 9 INJECTION, SOLUTION INTRAVENOUS at 20:22

## 2025-03-21 RX ADMIN — HEPARIN SODIUM 5000 UNIT(S): 1000 INJECTION INTRAVENOUS; SUBCUTANEOUS at 22:20

## 2025-03-21 RX ADMIN — Medication 125 MILLIGRAM(S): at 15:41

## 2025-03-21 RX ADMIN — KETOROLAC TROMETHAMINE 15 MILLIGRAM(S): 30 INJECTION, SOLUTION INTRAMUSCULAR; INTRAVENOUS at 12:36

## 2025-03-21 RX ADMIN — Medication 100 MILLIEQUIVALENT(S): at 14:24

## 2025-03-21 RX ADMIN — CEFTRIAXONE 2000 MILLIGRAM(S): 500 INJECTION, POWDER, FOR SOLUTION INTRAMUSCULAR; INTRAVENOUS at 16:28

## 2025-03-21 NOTE — CONSULT NOTE ADULT - ASSESSMENT
Patient is a 70 y/o M with a PMH of HTN, DM (2/2 prednisone use) and a PSH of b/l cataract repair, b/l corneal transplant, L knee meniscectomy complicated by infection, varicectomy last seen in the ED on 3/18 for a 2 week history of explosive, voluminous, watery diarrhea with lab results significan for WBC 19, CT with pancolitis, negative GI PCR, negative C. difficile, chemistries, and creatinine 1.39. Patient reports multiple episodes of diarrhea-watery, no bloody this morning and last night. Patient began to feel some dizziness, weakness, photophobia sensation and fatigue so came to the ER for evaluation. Vascular surgery was consulted for the possible SMV thrombosis seen on CT AP. Patient was initially hypotensive (84/50) but responded to 1L bolus of NS 4x, and BP is now 115/67, however patient is tachycardic to 119. CT AP showing worsening colitis at the descending and proximal sigmoid colon, persistent colitis at the cecum and proximal ascending colon, questionable hypodensity at the superior mesenteric vein at the level of the aortic bifurcation that could correspond to a filling defect on prior study. CTA AP revealed interval worsening in severe pancolitis with possible developing toxic megacolon, the transverse colon measuring 7-8 cm in diameter. Findings compatible with infectious etiology although inflammatory etiology is possible. The degree of involvement is not indicative of ischemic colitis. No SMA or SMV thrombosis was seen.  Patient is a 68 y/o M with a PMH of HTN, DM (2/2 prednisone use) and a PSH of b/l cataract repair, b/l corneal transplant, L knee meniscectomy complicated by infection, varicectomy last seen in the ED on 3/18 for a 2 week history of explosive, voluminous, watery diarrhea with lab results significan for WBC 19, CT with pancolitis, negative GI PCR, negative C. difficile, chemistries, and creatinine 1.39. Patient reports multiple episodes of diarrhea-watery, no bloody this morning and last night. Patient began to feel some dizziness, weakness, photophobia sensation and fatigue so came to the ER for evaluation. Vascular surgery was consulted for the possible SMV thrombosis seen on CT AP. Patient was initially hypotensive (84/50) but responded to 1L bolus of NS 4x, and BP is now 115/67, however patient is tachycardic to 119. CT AP showing worsening colitis at the descending and proximal sigmoid colon, persistent colitis at the cecum and proximal ascending colon, questionable hypodensity at the superior mesenteric vein at the level of the aortic bifurcation that could correspond to a filling defect on prior study. CTA AP revealed interval worsening in severe pancolitis with possible developing toxic megacolon, the transverse colon measuring 7-8 cm in diameter. Findings compatible with infectious etiology although inflammatory etiology is possible. The degree of involvement is not indicative of ischemic colitis. No SMA or SMV thrombosis was seen.     No acute vascular intervention  Discussed with attending Dr. Connors

## 2025-03-21 NOTE — ED ADULT NURSE NOTE - NSFALLRISKINTERV_ED_ALL_ED

## 2025-03-21 NOTE — CONSULT NOTE ADULT - ATTENDING COMMENTS
Patient presenting with worsening abdomen pain and significant diarrhea. CT scan showing distended colon concerning for infectious source with WBC count up to 20k. Was found to be cdiff negative previously and currently being treated for intra-abdominal sources and will cover for severe Cdiff. Clinically still appears dry so will give further volume and wean vasopressors as able. Case discussed with GI and plan for flex sig in the am.

## 2025-03-21 NOTE — ED PROVIDER NOTE - OBJECTIVE STATEMENT
70 yo M h/o dm, htn, hypothyroid, seen 3/18 for c/o 2 wk watery diarrhea  with lab evaluation notable for elevated white count of 19, CT with pancolitis, negative GI PCR, negative C. difficile, chemistries with elevated creatinine 1.3 days.  Patient was discharged with Percocet and Augmentin and reports relief of right lower quadrant pain he  started having 3/18 (present during last ED evaluation), last dose earlier this morning.  Patient reports multiple episodes of diarrhea-watery, no blood-6 episodes this a.m., 12 episodes since yesterday evening.  Patient began to feel some dizziness, weakness, photophobia sensation and fatigue so came to the ER for evaluation.  Patient reports subjective fever sensation.  No chest pain, palpitations, shortness of breath, URI symptoms, cough, dysuria.  Patient notes decreased urine output.  Patient reports nausea, no vomiting. + burping

## 2025-03-21 NOTE — ED ADULT NURSE NOTE - ED STAT RN HANDOFF DETAILS
Patient transported safely via stretcher, on a cardiac monitor accompanied by RN and 5 [facility] [place] resident and transport team.

## 2025-03-21 NOTE — ED PROVIDER NOTE - PROGRESS NOTE DETAILS
Pt discussed w Dr Early. Labs notable for nl lactate, high wbc increased from prior but unable to run mcv, etc; hgb/plt wnl.  Chem notable for kwaku w cr 1.8, slightly low na 131 low k 2.8 likely 2/2 severe diarrhea.  Mag nl.  Pt given K repletion.  BP improving w ivf.  K improving on rpt; additional po k ordered.  Cr also improving w ivf.  Bladder scan w 66 cc - no concern for obstruction; suspect kwaku all from dehydration 2/2 diarrhea  Pt given empiric abx including po vanco despite neg cdiff 2/2 clinical picture suggesting gi etiology, high wbc and hypotension.  CT w worsened colitis and concern for smv occlusion.  CTA ordered and pending read.  Vasc consulted.  Additional ivf ordered for bp ~ .  Pt w likely admit.  Pt signed out to Dr Freeman. Vasc reviewed ct and did not see occlusion/ischemia, eval pt and not concerned for vasc issue.  GI consulted and initial plan to admit to med w GI following but CTA, although neg for smv/sma issue,  read indicates possible toxic megacolon.  Surg consulted and will eval.  Pt signed out to Dr Freeman. Labs notable for nl lactate, high wbc increased from prior but unable to run mcv, etc; hgb/plt wnl.  Chem notable for kwaku w cr 1.8, slightly low na 131 low k 2.8 likely 2/2 severe diarrhea.  Mag nl.  Pt given K repletion.  BP improving w ivf.  K improving on rpt; additional po k ordered.  Cr also improving w ivf.  Bladder scan w 66 cc - no concern for obstruction; suspect kwaku all from dehydration 2/2 diarrhea  Pt given empiric abx including po vanco despite neg cdiff 2/2 clinical picture suggesting gi etiology, high wbc and hypotension.  CT w worsened colitis and concern for smv occlusion.  CTA ordered and pending read.  Vasc consulted.  Additional ivf ordered for bp ~ . MD Lydia: received signout from Dr. Toth. pt with concern for toxic megacolon on CT pending surgery recommendations. pt evaluated by surgery who stated pt should be admitted to sicu. on assessment pt awake alert nad laughing with surgery at bedside

## 2025-03-21 NOTE — ED PROVIDER NOTE - CLINICAL SUMMARY MEDICAL DECISION MAKING FREE TEXT BOX
Pt c/o ongoing diarrhea, rlq pain w/o ttp on exam, s/p ed eval for same 3/18 w pancolitis, high wbc, neg cdiff and gi pcr (also neg outpt stool studies per 3/18 note) w low bp on eval.  Suspect 2/2 hypovolemia from gi losses but ? 2/2 sepsis.  Initial lactate 1.6 not c/w sepsis.  Unable to reorder cdiff < 3 d since last study per EMR.  Will rpt ct abd to reassess for perf or other acute issue (no peritoneal signs on exam).  Likely admit for ivf, further gi eval; reassess. See progress notes for further mdm related documentation.

## 2025-03-21 NOTE — ED PROVIDER NOTE - PHYSICAL EXAMINATION
VITAL SIGNS: I have reviewed nursing notes and confirm.  CONSTITUTIONAL:  in no acute distress. tearful  SKIN:  warm and dry, no acute rash.   HEAD:  normocephalic, atraumatic.  EYES: PERRL, EOM intact; conjunctiva and sclera clear.  ENT: No nasal discharge; airway clear.   NECK: Supple; non tender.  CARD: S1, S2 normal; no murmurs, gallops, or rubs. Regular rate and rhythm.   RESP:  Clear to auscultation b/l, no wheezes, rales or rhonchi.  ABD: Normal bowel sounds; soft; non-distended; non-tender; no guarding/ rebound.  MSK: Normal ROM. No clubbing, cyanosis or edema. no ttp bilat le  NEURO: Alert, oriented, grossly unremarkable  PSYCH: Cooperative, mood and affect appropriate.

## 2025-03-21 NOTE — H&P ADULT - ASSESSMENT
70 yo M h/o dm, htn, hypothyroid, admitted for 2 weeks hx of voluminous watery diarrhea, CT findings of pancolitis and transverese colon of 8cm. Negative prior infectious work up, less likeli-mann of ishcemic and and possible inflammatory colitis. Given hypotension on presentation, on-going tachycardia, plan to admit patient to SICU for HD monitoring and close monitoring in the setting of possible developement of toxic megacolon.     Admit patient to SICU under Dr. Reynaga   NPO   IVF at 120cc/hr   SCDs/IS/OOB  SQH  Pain management with IV Tylenol; avoid narcotics if possibe     Antibiotics CFTX and Flagyll; PO Vancomycin until repeat C diff negative         70 yo M h/o dm, htn, hypothyroid, admitted for 2 weeks hx of voluminous watery diarrhea, CT findings of pancolitis and transverse colon of 8cm. Negative prior infectious work up, less likelihood of ischemic and possible inflammatory colitis. Given hypotension on presentation, on-going tachycardia, plan to admit patient to SICU for HD monitoring and close monitoring    Admit patient to SICU under Dr. Reynaga   NPO   IVF at 120cc/hr   SCDs/IS/OOB  SQH  Pain management with IV Tylenol; avoid narcotics if possible     Antibiotics CFTX and Flagyll; PO Vancomycin until repeat C diff negative       70 yo M h/o dm, htn, hypothyroid, 3/18 seen c/o 2 weeks hx of voluminous watery diarrhea, discharged same day. Represents 3/21 w/ CT findings of pancolitis and transverese colon of 8cm. Negative prior infectious work up, less likeli-mann of ishcemic and and possible inflammatory colitis. Given hypotension on presentation, on-going tachycardia, plan to admit patient to SICU for HD monitoring and close monitoring given electrolyte derangements, YOLI and ongoing fluid resuscitation     Admit patient to SICU under Dr. Reynaga   NPO   IVF at 120cc/hr   SCDs/IS/OOB  SQH  Pain management with IV Tylenol; avoid narcotics if possible     Antibiotics CFTX and Flagyll; PO Vancomycin until repeat C diff negative       68 yo M h/o dm, htn, hypothyroid, 3/18 seen c/o 2 weeks hx of voluminous watery diarrhea, discharged same day. Represents 3/21 w/ CT findings of pancolitis and transverse colon of 8cm. Negative prior infectious work up, less likelihood of ischemic given normal lactate and negative CT A and and possible inflammatory colitis. Given hypotension on presentation, on-going tachycardia, plan to admit patient to SICU for HD monitoring and close monitoring given electrolyte derangements, YOLI and ongoing fluid resuscitation     Admit patient to SICU under Dr. Reynaga   NPO   IVF at 120cc/hr   SCDs/IS/OOB  SQH  Pain management with IV Tylenol; avoid narcotics if possible     Antibiotics CFTX and Flagyll; PO Vancomycin until repeat C diff negative

## 2025-03-21 NOTE — ED ADULT NURSE REASSESSMENT NOTE - NS ED NURSE REASSESS COMMENT FT1
no episodes of diarrhea or vomiting since arrival to ed. Patient tolerating po water. Patient currently transported via stretcher to ct at this time, ivf infusing as ordered. Patient asking to delay po vancomycin and iv antibiotics until he speaks with Dr. Toth. Notified Dr. Toth of patient request.

## 2025-03-21 NOTE — ED ADULT TRIAGE NOTE - CHIEF COMPLAINT QUOTE
Pt presents to ED C/O frequent diarrhea x 3 weeks with pain to RLQ. States, " It is bloody when I wipe, sometimes it's black but I'm taking Pepto bismol." Pt denies blood thinners. Denies GI hx/ surg hx. Denies cardiac hx. Recently seen at [facility] ED as per pt.

## 2025-03-21 NOTE — ED ADULT NURSE NOTE - CHIEF COMPLAINT QUOTE
Pt presents to ED C/O frequent diarrhea x 3 weeks with pain to RLQ. States, " It is bloody when I wipe, sometimes it's black but I'm taking Pepto bismol." Pt denies blood thinners. Denies GI hx/ surg hx. Denies cardiac hx. Recently seen at [facility] ED as per pt.

## 2025-03-21 NOTE — ED ADULT NURSE NOTE - OBJECTIVE STATEMENT
Patient alert and orientedx4, presenting ambulatory with steady gait to the emergency department with c/o right lower abdominal pain, chills ongoing diarrhea x 1 week. Patient states he has decreased appetite, and generalized weakness.
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
## 2025-03-21 NOTE — CONSULT NOTE ADULT - SUBJECTIVE AND OBJECTIVE BOX
HPI:  Patient is a 68 y/o M with a PMH of HTN, DM (2/2 prednisone use) and a PSH of b/l cataract repair, b/l corneal transplant, L knee meniscectomy complicated by infection, varicectomy last seen in the ED on 3/18 for a 2 week history of explosive, voluminous, watery diarrhea with lab results significan for WBC 19, CT with pancolitis, negative GI PCR, negative C. difficile, chemistries, and creatinine 1.39.  Patient was discharged with Percocet and Augmentin after fluid resuscitation and potassium repletion. Since the last visit to the ED on 3/18,  patient reports relief of RLQ pain he developed 3/18. Patient reports multiple episodes of diarrhea-watery, no bloody this morning and last night. Patient began to feel some dizziness, weakness, photophobia sensation and fatigue so came to the ER for evaluation. Patient denies previous history of thrombosis. Patient denies N/V, CP/SOB, and abdominal pain. Vascular surgery was consulted for the possible SMV thrombosis seen on CT AP.     In the ED patient was afebrile, initially hypotensive (84/50) but responded to 1L bolus of NS 4x, and BP is now 115/67, however patient is tachycardic to 119. Patient is satting well on room. Patient received CTX/flagyl/vancomycin in the ED. CT AP was done and it showed worsening colitis at the descending and proximal sigmoid colon. Persistent colitis at the cecum and proximal ascending colon. Questionable hypodensity at the superior mesenteric vein at the level of the aortic bifurcation that could correspond to a filling defect on prior study. CTA AP was recommended to evaluate for mesenteric ischemia and SMV thrombosis. Also, a 2. 2 cm right adrenal nodule was noted on the CT AP. CTA AP revealed interval worsening in severe pancolitis with possible developing toxic megacolon, the transverse colon measuring 7-8 cm in diameter. Findings compatible with infectious etiology although inflammatory etiology is possible. The degree of involvement is not indicative of ischemic colitis. No SMA or SMV thrombosis was seen.     PMH: HTN, DM (2/2 prednisone use)  PSH: b/l cataract repair, b/l corneal transplant, L knee meniscectomy complicated by infection, varicectomy  Medications: Olmesartan, trulicity, mounjaro  Allergies: NKDA, cat dander  SH: denies alcohol, tobacco, and recreational drug use      Vital Signs Last 24 Hrs  T(C): 36.4 (21 Mar 2025 16:25), Max: 37 (21 Mar 2025 11:52)  T(F): 97.6 (21 Mar 2025 16:25), Max: 98.6 (21 Mar 2025 11:52)  HR: 100 (21 Mar 2025 16:57) (88 - 112)  BP: 100/52 (21 Mar 2025 16:57) (70/36 - 100/52)  BP(mean): 61 (21 Mar 2025 12:45) (61 - 61)  RR: 17 (21 Mar 2025 16:57) (17 - 18)  SpO2: 98% (21 Mar 2025 16:57) (96% - 99%)    Parameters below as of 21 Mar 2025 16:57  Patient On (Oxygen Delivery Method): room air        PHYSICAL EXAM:   Gen: NAD, resting comfortably   CV: NSR  Pulm: no respiratory distress on RA, CTAB   Abd: soft, ND, NTTP, no rebound or guarding   Ext: WWP, no edema  Pulses: B/l LE DP/PTs palpable  Neuro: motor/sensory grossly intact     LABS:                        12.1   19.86 )-----------( 339      ( 21 Mar 2025 14:51 )             See note    03-21    132[L]  |  106  |  22  ----------------------------<  98  3.0[L]   |  16[L]  |  1.48[H]    Ca    7.2[L]      21 Mar 2025 16:54  Mg     1.7     03-21    TPro  6.2  /  Alb  2.6[L]  /  TBili  1.0  /  DBili  x   /  AST  13  /  ALT  15  /  AlkPhos  71  03-21    LIVER FUNCTIONS - ( 21 Mar 2025 12:22 )  Alb: 2.6 g/dL / Pro: 6.2 g/dL / ALK PHOS: 71 U/L / ALT: 15 U/L / AST: 13 U/L / GGT: x           PT/INR - ( 21 Mar 2025 12:22 )   PT: 17.2 sec;   INR: 1.50          PTT - ( 21 Mar 2025 12:22 )  PTT:26.7 sec      CAPILLARY BLOOD GLUCOSE        Urinalysis Basic - ( 21 Mar 2025 16:54 )    Color: x / Appearance: x / SG: x / pH: x  Gluc: 98 mg/dL / Ketone: x  / Bili: x / Urobili: x   Blood: x / Protein: x / Nitrite: x   Leuk Esterase: x / RBC: x / WBC x   Sq Epi: x / Non Sq Epi: x / Bacteria: x        Urinalysis with Rflx Culture (collected 21 Mar 2025 12:04)         RADIOLOGY & ADDITIONAL STUDIES:

## 2025-03-21 NOTE — ED PROVIDER NOTE - CARE PLAN
1 Principal Discharge DX:	Colitis  Secondary Diagnosis:	YOLI (acute kidney injury)  Secondary Diagnosis:	Hypokalemia  Secondary Diagnosis:	Hypovolemia dehydration

## 2025-03-21 NOTE — H&P ADULT - NSHPLABSRESULTS_GEN_ALL_CORE
NEURO: IV Tylenol RTC; Zofran PRN  CV: MAPs >65; HTN Olmesartan 5mg (on hold); HLD: Rosuvastatin (hold)  PULM: Room air, IS/OOB  GI/FEN: CLD; Colitis of unkown origin - transverse colon 8cm   : Strict I&Os; YOLI 1.8 on admission; BPH - on Tamsulosin 0.4mg   ENDO: ISS; On Munjaro (last dose 3/15/25); Testosterone 60mg IM (last dose 3/18) and anastazole(prevents peripheral conversion testosterone to estrogen) last dose 1 week ago  ID: PO Vanco 125 q6 until cdiff cx prove negative; CFTX 2g q24; Flagyl 500 q8 (3/21 - )  PPX: SCDs; SQH   LINES: PIVs,   WOUNDS/DRAINS: none

## 2025-03-21 NOTE — CONSULT NOTE ADULT - ASSESSMENT
70 yo M h/o dm, htn, hypothyroid, admitted for 2 weeks hx of voluminous watery diarrhea, CT findings of pancolitis and transverese colon of 8cm. Negative prior infectious work up, less likeli-mann of ishcemic and and possible inflammatory colitis. Given hypotension on presentation, on-going tachycardia, plan to admit patient to SICU for HD monitoring and close monitoring given electrolyte derangements, YOLI and ongoing fluid resuscitation     NEURO: IV Tylenol RTC; Zofran PRN  CV: MAPs >65; HTN Olmesartan 5mg (on hold)  PULM: R  GI/FEN: CLD; Colitis of unkown origin - transverse colon 8cm   : Strict I&Os; BPH - on Tamsulosin 0.4mg   ENDO: ISS; On Munjaro (last dose 3/15/25)  ID: PO Vanco 125 q6; CFTX 2g q24; Flagyl 500 q8 (3/21 - )  PPX: SCDs; SQH   LINES: PIVs,   WOUNDS/DRAINS: none        68 yo M h/o dm, htn, hypothyroid, admitted for 2 weeks hx of voluminous watery diarrhea, CT findings of pancolitis and transverese colon of 8cm. Negative prior infectious work up, less likeli-mann of ishcemic and and possible inflammatory colitis. Given hypotension on presentation, on-going tachycardia, plan to admit patient to SICU for HD monitoring and close monitoring given electrolyte derangements, YOLI and ongoing fluid resuscitation     NEURO: IV Tylenol RTC; Zofran PRN  CV: MAPs >65; HTN Olmesartan 5mg (on hold); HLD: Rosuvastatin (hold)  PULM: Room air, IS/OOB  GI/FEN: CLD; Colitis of unkown origin - transverse colon 8cm   : Strict I&Os; BPH - on Tamsulosin 0.4mg   ENDO: ISS; On Munjaro (last dose 3/15/25); Testosterone (holding)  ID: PO Vanco 125 q6; CFTX 2g q24; Flagyl 500 q8 (3/21 - )  PPX: SCDs; SQH   LINES: PIVs,   WOUNDS/DRAINS: none        70 yo M h/o dm, htn, hypothyroid, 3/18 seen c/o 2 weeks hx of voluminous watery diarrhea, discharged same day. Represents 3/21 w/ CT findings of pancolitis and transverese colon of 8cm. Negative prior infectious work up, less likeli-mann of ishcemic and and possible inflammatory colitis. Given hypotension on presentation, on-going tachycardia, plan to admit patient to SICU for HD monitoring and close monitoring given electrolyte derangements, YOLI and ongoing fluid resuscitation     NEURO: IV Tylenol RTC; Zofran PRN  CV: MAPs >65; HTN Olmesartan 5mg (on hold); HLD: Rosuvastatin (hold)  PULM: Room air, IS/OOB  GI/FEN: CLD; Colitis of unkown origin - transverse colon 8cm   : Strict I&Os; YOLI 1.8 on admission; BPH - on Tamsulosin 0.4mg   ENDO: ISS; On Munjaro (last dose 3/15/25); Testosterone 60mg IM (last dose 3/18) and anastazole(prevents peripheral conversion testosterone to estrogen) last dose 1 week ago  ID: PO Vanco 125 q6 until cdiff cx prove negative; CFTX 2g q24; Flagyl 500 q8 (3/21 - )  PPX: SCDs; SQH   LINES: PIVs,   WOUNDS/DRAINS: none

## 2025-03-21 NOTE — CONSULT NOTE ADULT - ATTENDING COMMENTS
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 Vascualr srugery was consulted for possible SMV thrombus on non-contrast scan, but repeat CTA was negative for SMA/SMV thrombosis (I also see patent celiac, ZEUS, and hypogastric arteries). It did suggest possible toxic megacolon. On exam, he is A&Ox3, NAD, normal resp effort, abdomen soft, mildly tender, nondistended, palpable femoral pulses.     ASSESSMENT  - Possible toxic megacolon    PLAN & RECOMMENDATIONS  - No acute vascular surgery intervention. Reconsult PRN    Thank you,    Grover Connors MD   of Vascular Surgery  [facility] at [address] [place] [address]  Office: [phone]; Fax: [phone]  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 Vascualr srugery was consulted for possible SMV thrombus on non-contrast scan, but repeat CTA was negative for SMA/SMV thrombosis (I also see patent celiac, ZEUS, and hypogastric arteries). It did suggest possible toxic megacolon. On exam, he is A&Ox3, NAD, normal resp effort, abdomen soft, mildly tender, nondistended, palpable femoral pulses.     ASSESSMENT  - Pan colitis?    PLAN & RECOMMENDATIONS  - No acute vascular surgery intervention. Reconsult PRN  - Cw care per ICU, GI and general surgery teams    Thank you,    Grover Connors MD   of Vascular Surgery  [facility] at [address] [place] [address]  Office: [phone]; Fax: [phone]  [email or web address]
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
## 2025-03-22 LAB
ADD ON TEST-SPECIMEN IN LAB: SIGNIFICANT CHANGE UP
ADD ON TEST-SPECIMEN IN LAB: SIGNIFICANT CHANGE UP
ALBUMIN SERPL ELPH-MCNC: 1.9 G/DL — LOW (ref 3.3–5)
ALP SERPL-CCNC: 90 U/L — SIGNIFICANT CHANGE UP (ref 40–120)
ALT FLD-CCNC: 12 U/L — SIGNIFICANT CHANGE UP (ref 10–45)
ANION GAP SERPL CALC-SCNC: 10 MMOL/L — SIGNIFICANT CHANGE UP (ref 5–17)
ANION GAP SERPL CALC-SCNC: 11 MMOL/L — SIGNIFICANT CHANGE UP (ref 5–17)
AST SERPL-CCNC: 14 U/L — SIGNIFICANT CHANGE UP (ref 10–40)
BASOPHILS # BLD AUTO: 0.19 K/UL — SIGNIFICANT CHANGE UP (ref 0–0.2)
BASOPHILS NFR BLD AUTO: 0.9 % — SIGNIFICANT CHANGE UP (ref 0–2)
BILIRUB DIRECT SERPL-MCNC: 0.3 MG/DL — SIGNIFICANT CHANGE UP (ref 0–0.3)
BILIRUB INDIRECT FLD-MCNC: 0.2 MG/DL — SIGNIFICANT CHANGE UP (ref 0.2–1)
BILIRUB SERPL-MCNC: 0.5 MG/DL — SIGNIFICANT CHANGE UP (ref 0.2–1.2)
BUN SERPL-MCNC: 17 MG/DL — SIGNIFICANT CHANGE UP (ref 7–23)
BUN SERPL-MCNC: 20 MG/DL — SIGNIFICANT CHANGE UP (ref 7–23)
C DIFF GDH STL QL: NEGATIVE — SIGNIFICANT CHANGE UP
C DIFF GDH STL QL: SIGNIFICANT CHANGE UP
CALCIUM SERPL-MCNC: 7.2 MG/DL — LOW (ref 8.4–10.5)
CALCIUM SERPL-MCNC: 7.4 MG/DL — LOW (ref 8.4–10.5)
CHLORIDE SERPL-SCNC: 108 MMOL/L — SIGNIFICANT CHANGE UP (ref 96–108)
CHLORIDE SERPL-SCNC: 110 MMOL/L — HIGH (ref 96–108)
CO2 SERPL-SCNC: 14 MMOL/L — LOW (ref 22–31)
CO2 SERPL-SCNC: 16 MMOL/L — LOW (ref 22–31)
CORTIS AM PEAK SERPL-MCNC: 13.2 UG/DL — SIGNIFICANT CHANGE UP (ref 6.02–18.4)
CREAT SERPL-MCNC: 1.19 MG/DL — SIGNIFICANT CHANGE UP (ref 0.5–1.3)
CREAT SERPL-MCNC: 1.4 MG/DL — HIGH (ref 0.5–1.3)
CRP SERPL-MCNC: 253.9 MG/L — HIGH (ref 0–4)
E HISTOLYT DNA STL QL NAA+NON-PROBE: SIGNIFICANT CHANGE UP
EGFR: 54 ML/MIN/1.73M2 — LOW
EGFR: 54 ML/MIN/1.73M2 — LOW
EGFR: 65 ML/MIN/1.73M2 — SIGNIFICANT CHANGE UP
EGFR: 65 ML/MIN/1.73M2 — SIGNIFICANT CHANGE UP
EOSINOPHIL # BLD AUTO: 0 K/UL — SIGNIFICANT CHANGE UP (ref 0–0.5)
EOSINOPHIL NFR BLD AUTO: 0 % — SIGNIFICANT CHANGE UP (ref 0–6)
ERYTHROCYTE [SEDIMENTATION RATE] IN BLOOD: 22 MM/HR — HIGH
GI PCR PANEL: SIGNIFICANT CHANGE UP
GLUCOSE SERPL-MCNC: 111 MG/DL — HIGH (ref 70–99)
GLUCOSE SERPL-MCNC: 78 MG/DL — SIGNIFICANT CHANGE UP (ref 70–99)
HCT VFR BLD CALC: 29.9 % — LOW (ref 39–50)
HCT VFR BLD CALC: SIGNIFICANT CHANGE UP (ref 39–50)
HGB BLD-MCNC: 11 G/DL — LOW (ref 13–17)
HGB BLD-MCNC: 12.5 G/DL — LOW (ref 13–17)
LYMPHOCYTES # BLD AUTO: 1.19 K/UL — SIGNIFICANT CHANGE UP (ref 1–3.3)
LYMPHOCYTES # BLD AUTO: 5.6 % — LOW (ref 13–44)
MAGNESIUM SERPL-MCNC: 1.7 MG/DL — SIGNIFICANT CHANGE UP (ref 1.6–2.6)
MAGNESIUM SERPL-MCNC: 1.9 MG/DL — SIGNIFICANT CHANGE UP (ref 1.6–2.6)
MCHC RBC-ENTMCNC: 31.4 PG — SIGNIFICANT CHANGE UP (ref 27–34)
MCHC RBC-ENTMCNC: 36.8 G/DL — HIGH (ref 32–36)
MCHC RBC-ENTMCNC: SIGNIFICANT CHANGE UP (ref 27–34)
MCHC RBC-ENTMCNC: SIGNIFICANT CHANGE UP (ref 32–36)
MCV RBC AUTO: 85.4 FL — SIGNIFICANT CHANGE UP (ref 80–100)
MCV RBC AUTO: SIGNIFICANT CHANGE UP (ref 80–100)
MONOCYTES # BLD AUTO: 1.77 K/UL — HIGH (ref 0–0.9)
MONOCYTES NFR BLD AUTO: 8.3 % — SIGNIFICANT CHANGE UP (ref 2–14)
NEUTROPHILS # BLD AUTO: 17.98 K/UL — HIGH (ref 1.8–7.4)
NEUTROPHILS NFR BLD AUTO: 83.4 % — HIGH (ref 43–77)
NRBC BLD AUTO-RTO: 0 /100 WBCS — SIGNIFICANT CHANGE UP (ref 0–0)
PHOSPHATE SERPL-MCNC: 2 MG/DL — LOW (ref 2.5–4.5)
PHOSPHATE SERPL-MCNC: 2.2 MG/DL — LOW (ref 2.5–4.5)
PLATELET # BLD AUTO: 358 K/UL — SIGNIFICANT CHANGE UP (ref 150–400)
PLATELET # BLD AUTO: 385 K/UL — SIGNIFICANT CHANGE UP (ref 150–400)
POTASSIUM SERPL-MCNC: 3.2 MMOL/L — LOW (ref 3.5–5.3)
POTASSIUM SERPL-MCNC: 3.5 MMOL/L — SIGNIFICANT CHANGE UP (ref 3.5–5.3)
POTASSIUM SERPL-SCNC: 3.2 MMOL/L — LOW (ref 3.5–5.3)
POTASSIUM SERPL-SCNC: 3.5 MMOL/L — SIGNIFICANT CHANGE UP (ref 3.5–5.3)
PROT SERPL-MCNC: 4.9 G/DL — LOW (ref 6–8.3)
RBC # BLD: 3.5 M/UL — LOW (ref 4.2–5.8)
RBC # BLD: SIGNIFICANT CHANGE UP (ref 4.2–5.8)
RBC # FLD: 14.2 % — SIGNIFICANT CHANGE UP (ref 10.3–14.5)
RBC # FLD: SIGNIFICANT CHANGE UP (ref 10.3–14.5)
SODIUM SERPL-SCNC: 133 MMOL/L — LOW (ref 135–145)
SODIUM SERPL-SCNC: 136 MMOL/L — SIGNIFICANT CHANGE UP (ref 135–145)
T3 SERPL-MCNC: 47 NG/DL — LOW (ref 80–200)
T4 AB SER-ACNC: 2.31 UG/DL — LOW (ref 4.5–11.7)
TSH SERPL-MCNC: 1.6 UIU/ML — SIGNIFICANT CHANGE UP (ref 0.27–4.2)
WBC # BLD: 20.74 K/UL — HIGH (ref 3.8–10.5)
WBC # BLD: 21.33 K/UL — HIGH (ref 3.8–10.5)
WBC # FLD AUTO: 20.74 K/UL — HIGH (ref 3.8–10.5)
WBC # FLD AUTO: 21.33 K/UL — HIGH (ref 3.8–10.5)

## 2025-03-22 PROCEDURE — 99233 SBSQ HOSP IP/OBS HIGH 50: CPT | Mod: GC

## 2025-03-22 PROCEDURE — 99222 1ST HOSP IP/OBS MODERATE 55: CPT

## 2025-03-22 PROCEDURE — 71045 X-RAY EXAM CHEST 1 VIEW: CPT | Mod: 26

## 2025-03-22 PROCEDURE — ZZZZZ: CPT

## 2025-03-22 PROCEDURE — 36000 PLACE NEEDLE IN VEIN: CPT

## 2025-03-22 PROCEDURE — 76937 US GUIDE VASCULAR ACCESS: CPT | Mod: 26

## 2025-03-22 RX ORDER — NOREPINEPHRINE BITARTRATE 8 MG
0.05 SOLUTION INTRAVENOUS
Qty: 8 | Refills: 0 | Status: DISCONTINUED | OUTPATIENT
Start: 2025-03-22 | End: 2025-03-22

## 2025-03-22 RX ORDER — ALBUMIN (HUMAN) 12.5 G/50ML
250 INJECTION, SOLUTION INTRAVENOUS ONCE
Refills: 0 | Status: COMPLETED | OUTPATIENT
Start: 2025-03-22 | End: 2025-03-22

## 2025-03-22 RX ORDER — CEFTRIAXONE 500 MG/1
2000 INJECTION, POWDER, FOR SOLUTION INTRAMUSCULAR; INTRAVENOUS EVERY 24 HOURS
Refills: 0 | Status: DISCONTINUED | OUTPATIENT
Start: 2025-03-22 | End: 2025-03-24

## 2025-03-22 RX ORDER — SOD PHOS DI, MONO/K PHOS MONO 250 MG
3 TABLET ORAL ONCE
Refills: 0 | Status: COMPLETED | OUTPATIENT
Start: 2025-03-22 | End: 2025-03-22

## 2025-03-22 RX ORDER — METRONIDAZOLE 250 MG
800 TABLET ORAL EVERY 6 HOURS
Refills: 0 | Status: DISCONTINUED | OUTPATIENT
Start: 2025-03-22 | End: 2025-03-24

## 2025-03-22 RX ORDER — POTASSIUM PHOSPHATE, MONOBASIC POTASSIUM PHOSPHATE, DIBASIC INJECTION, 236; 224 MG/ML; MG/ML
30 SOLUTION, CONCENTRATE INTRAVENOUS ONCE
Refills: 0 | Status: COMPLETED | OUTPATIENT
Start: 2025-03-22 | End: 2025-03-22

## 2025-03-22 RX ORDER — HEPARIN SODIUM 1000 [USP'U]/ML
5000 INJECTION INTRAVENOUS; SUBCUTANEOUS EVERY 8 HOURS
Refills: 0 | Status: DISCONTINUED | OUTPATIENT
Start: 2025-03-22 | End: 2025-03-24

## 2025-03-22 RX ORDER — MAGNESIUM SULFATE 500 MG/ML
2 SYRINGE (ML) INJECTION ONCE
Refills: 0 | Status: COMPLETED | OUTPATIENT
Start: 2025-03-22 | End: 2025-03-22

## 2025-03-22 RX ORDER — TAMSULOSIN HYDROCHLORIDE 0.4 MG/1
0.4 CAPSULE ORAL AT BEDTIME
Refills: 0 | Status: DISCONTINUED | OUTPATIENT
Start: 2025-03-22 | End: 2025-03-24

## 2025-03-22 RX ORDER — SODIUM CHLORIDE 9 G/1000ML
500 INJECTION, SOLUTION INTRAVENOUS ONCE
Refills: 0 | Status: COMPLETED | OUTPATIENT
Start: 2025-03-22 | End: 2025-03-22

## 2025-03-22 RX ORDER — MAGNESIUM SULFATE 500 MG/ML
1 SYRINGE (ML) INJECTION ONCE
Refills: 0 | Status: COMPLETED | OUTPATIENT
Start: 2025-03-22 | End: 2025-03-22

## 2025-03-22 RX ORDER — HYPROMELLOSE 0.4 %
1 DROPS OPHTHALMIC (EYE) EVERY 6 HOURS
Refills: 0 | Status: DISCONTINUED | OUTPATIENT
Start: 2025-03-22 | End: 2025-03-24

## 2025-03-22 RX ORDER — SODIUM CHLORIDE 9 G/1000ML
1000 INJECTION, SOLUTION INTRAVENOUS ONCE
Refills: 0 | Status: COMPLETED | OUTPATIENT
Start: 2025-03-22 | End: 2025-03-22

## 2025-03-22 RX ORDER — TESTOSTERONE 5.5 MG/1
0 GEL NASAL
Refills: 0 | DISCHARGE

## 2025-03-22 RX ADMIN — Medication 125 MILLIGRAM(S): at 09:34

## 2025-03-22 RX ADMIN — POTASSIUM PHOSPHATE, MONOBASIC POTASSIUM PHOSPHATE, DIBASIC INJECTION, 83.33 MILLIMOLE(S): 236; 224 SOLUTION, CONCENTRATE INTRAVENOUS at 07:02

## 2025-03-22 RX ADMIN — Medication 20 MILLIEQUIVALENT(S): at 18:59

## 2025-03-22 RX ADMIN — Medication 1 DROP(S): at 14:19

## 2025-03-22 RX ADMIN — POTASSIUM PHOSPHATE, MONOBASIC POTASSIUM PHOSPHATE, DIBASIC INJECTION, 83.33 MILLIMOLE(S): 236; 224 SOLUTION, CONCENTRATE INTRAVENOUS at 19:01

## 2025-03-22 RX ADMIN — POTASSIUM PHOSPHATE, MONOBASIC POTASSIUM PHOSPHATE, DIBASIC INJECTION, 83.33 MILLIMOLE(S): 236; 224 SOLUTION, CONCENTRATE INTRAVENOUS at 01:21

## 2025-03-22 RX ADMIN — Medication 100 MILLIEQUIVALENT(S): at 00:10

## 2025-03-22 RX ADMIN — HEPARIN SODIUM 5000 UNIT(S): 1000 INJECTION INTRAVENOUS; SUBCUTANEOUS at 22:02

## 2025-03-22 RX ADMIN — Medication 100 MILLIEQUIVALENT(S): at 01:11

## 2025-03-22 RX ADMIN — Medication 5 MILLIGRAM(S): at 14:18

## 2025-03-22 RX ADMIN — Medication 3 PACKET(S): at 19:00

## 2025-03-22 RX ADMIN — Medication 25 GRAM(S): at 00:10

## 2025-03-22 RX ADMIN — Medication 100 MILLIGRAM(S): at 06:52

## 2025-03-22 RX ADMIN — Medication 125 MILLIGRAM(S): at 03:23

## 2025-03-22 RX ADMIN — SODIUM CHLORIDE 3000 MILLILITER(S): 9 INJECTION, SOLUTION INTRAVENOUS at 02:00

## 2025-03-22 RX ADMIN — HEPARIN SODIUM 5000 UNIT(S): 1000 INJECTION INTRAVENOUS; SUBCUTANEOUS at 12:25

## 2025-03-22 RX ADMIN — Medication 100 MILLIEQUIVALENT(S): at 03:23

## 2025-03-22 RX ADMIN — Medication 1 APPLICATION(S): at 07:40

## 2025-03-22 RX ADMIN — Medication 50 GRAM(S): at 06:16

## 2025-03-22 RX ADMIN — CEFTRIAXONE 100 MILLIGRAM(S): 500 INJECTION, POWDER, FOR SOLUTION INTRAMUSCULAR; INTRAVENOUS at 16:57

## 2025-03-22 RX ADMIN — TAMSULOSIN HYDROCHLORIDE 0.4 MILLIGRAM(S): 0.4 CAPSULE ORAL at 09:34

## 2025-03-22 RX ADMIN — Medication 100 MILLIEQUIVALENT(S): at 02:14

## 2025-03-22 RX ADMIN — Medication 100 GRAM(S): at 18:59

## 2025-03-22 RX ADMIN — ALBUMIN (HUMAN) 125 MILLILITER(S): 12.5 INJECTION, SOLUTION INTRAVENOUS at 16:57

## 2025-03-22 RX ADMIN — SODIUM CHLORIDE 3000 MILLILITER(S): 9 INJECTION, SOLUTION INTRAVENOUS at 02:19

## 2025-03-22 RX ADMIN — Medication 100 MILLIGRAM(S): at 14:18

## 2025-03-22 RX ADMIN — SODIUM CHLORIDE 120 MILLILITER(S): 9 INJECTION, SOLUTION INTRAVENOUS at 22:09

## 2025-03-22 RX ADMIN — SODIUM CHLORIDE 1000 MILLILITER(S): 9 INJECTION, SOLUTION INTRAVENOUS at 00:18

## 2025-03-22 NOTE — PROCEDURE NOTE - NSUS ED ADDITIONAL DETAIL1 FT
PIV placed under US guidance. Line with blood return and flushes easily. Dressed with sterile dressing. Pt tolerated procedure well.
PIV placed under US guidance. Line with blood return and flushes easily. Dressed with sterile dressing. Pt tolerated procedure well.

## 2025-03-22 NOTE — CONSULT NOTE ADULT - CONSULT REQUESTED DATE/TIME
21-Mar-2025 17:56
22-Mar-2025 18:26
21-Mar-2025 20:16
Gen: Alert, NAD  Head/eyes: NC/AT, PERRL  ENT: airway patent  Neck: supple  Pulm/lung: Bilateral clear BS  CV/heart: +tachycardia  GI/Abd: soft, NT/ND, +BS, no guarding/rebound tenderness  Musculoskeletal: no edema/erythema/cyanosis  Skin: no rash  Neuro: AAOx3, grossly intact

## 2025-03-22 NOTE — CONSULT NOTE ADULT - CONSULT REASON
Possible SMV thrombosis ISO colitis
Diarrhea
HD monitoring and electrolyte derangements in the setting of colitis

## 2025-03-22 NOTE — PATIENT PROFILE ADULT - FALL HARM RISK - HARM RISK INTERVENTIONS
Assistance with ambulation/Assistance OOB with selected safe patient handling equipment/Communicate Risk of Fall with Harm to all staff/Discuss with provider need for PT consult/Monitor for mental status changes/Monitor gait and stability/Provide patient with walking aids - walker, cane, crutches/Reinforce activity limits and safety measures with patient and family/Review medications for side effects contributing to fall risk/Sit up slowly, dangle for a short time, stand at bedside before walking/Tailored Fall Risk Interventions/Toileting schedule using arm’s reach rule for commode and bathroom/Visual Cue: Yellow wristband and red socks/Bed in lowest position, wheels locked, appropriate side rails in place/Call bell, personal items and telephone in reach/Instruct patient to call for assistance before getting out of bed or chair/Non-slip footwear when patient is out of bed/[place] to call system/Physically safe environment - no spills, clutter or unnecessary equipment/Purposeful Proactive Rounding/Room/bathroom lighting operational, light cord in reach
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
## 2025-03-22 NOTE — PROGRESS NOTE ADULT - ASSESSMENT
70 yo M PMH DM, HTN, hypothyroidism, presented to the ED on 3/18 c/o 2 weeks of voluminous watery diarrhea, discharged same day on augmentin. Represents 3/21 w/ CT findings of pancolitis and transverese colon of 8cm. Negative prior infectious work up, less likeli-mann of ishcemic and and possible inflammatory colitis. Given hypotension on presentation, on-going tachycardia, plan to admit patient to SICU for HD monitoring and close monitoring given electrolyte derangements, YOLI and ongoing fluid resuscitation .    Plan: 68 yo M PMH DM, HTN, hypothyroidism, presented to the ED on 3/18 c/o 2 weeks of voluminous watery diarrhea, discharged same day on augmentin. Represents 3/21 w/ CT findings of pancolitis and transverese colon of 8cm. Negative prior infectious work up, less likeli-mann of ishcemic and and possible inflammatory colitis. Given hypotension on presentation, on-going tachycardia, plan to admit patient to SICU for HD monitoring and close monitoring given electrolyte derangements, YOLI and ongoing fluid resuscitation .    Plan:  GI consult  f/u stool studies  rest of care per SICU team

## 2025-03-22 NOTE — CONSULT NOTE ADULT - ASSESSMENT
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 Patient describes watery diarrhea development approximately 2 weeks prior to this presentation. States the diarrhea is "watery and yellow" without obvious solid or semiformed brown stool. States this occurs between 12-20 times per day.     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 Would expect hematochezia or prodromal complaints in the classic presentation of inflammatory bowel disease. Would expect pathogen identification of GI PCR as well as fever or hematochezia if infectious. However, certainly possible for this to be an uncommon presentation of either of the above pathologies. Identified to have redundant sigmoid and descending colon on CT scan. Possible functional related constipation with overflow diarrhea, however this would not classically lead to findings concerning for bowel wall inflammation on CT scan.   - After discussion with gastroenterology attending, possible this patient presenting with atypical infectious colitis including cryptosporidia species not identified on GI PCR. Please consult infectious disease for further evaluation and initiate nitazoxanide  - Currently receiving metronidazole, ceftriaxone, and oral vancomycin  - Resuscitation per primary team     Heber Courtney,   Gastroenterology Fellow  GI Consult Pager Weekdays 7am-5pm: [phone]  Weeknights/Weekend/Holiday Coverage: Please Call the  for Contact Information  Follow Up Questions Welcome via [place] [employer] Teams Messenger

## 2025-03-22 NOTE — PROGRESS NOTE ADULT - SUBJECTIVE AND OBJECTIVE BOX
ON:     POCUS: alines in anteriors/apices, no effusions at bases. LA 1.4, dry appearing with borderline MAPs given additional 1L LR bolus. Lytes repleted. patient refused ramires.  GI consulted. c diff & GI PCR ordered. Desat 88% while sleeping, 2L NC. CXR ordered. MAPs 55, spont improve 70s. Oliguric, , VTI 17, 500cc bolus, repeat VTI 22, BP improved to MAPs 80s, POCUS w/ alines throughout, 500cc bolus. UO imp. Daily alcohol use in question, son overheard by nursing questioning excessive ETOH use over phone, CIWA ordered.       SUBJECTIVE: Pt seen and examined at bedside this am by ICU team. Patient is lying comfortably in bed with no complaints. Tolerating diet, pain well controlled with current regimen. Patient denies fever, nausea, vomiting, chest pain, and shortness of breath. Patient is passing gas and having bowel movements.       MEDICATIONS  (STANDING):  cefTRIAXone   IVPB 2000 milliGRAM(s) IV Intermittent every 24 hours  chlorhexidine 2% Cloths 1 Application(s) Topical <User Schedule>  dextrose 5%. 1000 milliLiter(s) (50 mL/Hr) IV Continuous <Continuous>  dextrose 5%. 1000 milliLiter(s) (100 mL/Hr) IV Continuous <Continuous>  dextrose 50% Injectable 25 Gram(s) IV Push once  dextrose 50% Injectable 12.5 Gram(s) IV Push once  dextrose 50% Injectable 25 Gram(s) IV Push once  glucagon  Injectable 1 milliGRAM(s) IntraMuscular once  insulin lispro (ADMELOG) corrective regimen sliding scale   SubCutaneous three times a day before meals  lactated ringers. 1000 milliLiter(s) (120 mL/Hr) IV Continuous <Continuous>  metroNIDAZOLE  IVPB 500 milliGRAM(s) IV Intermittent every 8 hours  norepinephrine Infusion 0.05 MICROgram(s)/kG/Min (9.87 mL/Hr) IV Continuous <Continuous>  tamsulosin 0.4 milliGRAM(s) Oral at bedtime  vancomycin    Solution 125 milliGRAM(s) Oral every 6 hours    MEDICATIONS  (PRN):  dextrose Oral Gel 15 Gram(s) Oral once PRN Blood Glucose LESS THAN 70 milliGRAM(s)/deciliter  ondansetron Injectable 4 milliGRAM(s) IV Push every 6 hours PRN Nausea and/or Vomiting      Drips:     ICU Vital Signs Last 24 Hrs  T(C): 36.2 (22 Mar 2025 05:05), Max: 37.2 (21 Mar 2025 17:56)  T(F): 97.2 (22 Mar 2025 05:05), Max: 98.9 (21 Mar 2025 17:56)  HR: 92 (22 Mar 2025 06:18) (86 - 119)  BP: 117/72 (22 Mar 2025 06:18) (70/36 - 117/72)  BP(mean): 89 (22 Mar 2025 06:18) (56 - 89)  ABP: --  ABP(mean): --  RR: 22 (22 Mar 2025 06:18) (16 - 35)  SpO2: 98% (22 Mar 2025 06:18) (90% - 99%)    O2 Parameters below as of 22 Mar 2025 06:18  Patient On (Oxygen Delivery Method): nasal cannula w/ humidification  O2 Flow (L/min): 2          Physical Exam:  General: NAD  HEENT: NC/AT, EOMI, PERRLA, normal hearing, no oral lesions, neck supple w/o LAD  Pulmonary: Nonlabored breathing, no respiratory distress, CTA-B  Cardiovascular: NSR, no murmurs  Abdominal: soft, NT/ND, +BS, no organomegaly  Extremities: WWP, 5/5 strength x 4, no clubbing/cyanosis/edema  Neuro: A/O x3, CNs II-XII grossly intact, normal motor/sensation, no focal deficits  Pulses: palpable distal pulses    Lines/tubes/drains:  Ramires:	      Vent settings:      I&O's Summary    21 Mar 2025 07:01  -  22 Mar 2025 07:00  --------------------------------------------------------  IN: 3081.6 mL / OUT: 400 mL / NET: 2681.6 mL        LABS:                        11.0   20.74 )-----------( 385      ( 22 Mar 2025 05:30 )             29.9     03-22    133[L]  |  108  |  20  ----------------------------<  78  3.5   |  14[L]  |  1.40[H]    Ca    7.2[L]      22 Mar 2025 05:30  Phos  2.2     03-22  Mg     1.7     03-22    TPro  4.9[L]  /  Alb  1.9[L]  /  TBili  0.5  /  DBili  0.3  /  AST  14  /  ALT  12  /  AlkPhos  90  03-22    PT/INR - ( 21 Mar 2025 21:24 )   PT: 17.4 sec;   INR: 1.52          PTT - ( 21 Mar 2025 21:24 )  PTT:27.6 sec  Urinalysis Basic - ( 22 Mar 2025 05:30 )    Color: x / Appearance: x / SG: x / pH: x  Gluc: 78 mg/dL / Ketone: x  / Bili: x / Urobili: x   Blood: x / Protein: x / Nitrite: x   Leuk Esterase: x / RBC: x / WBC x   Sq Epi: x / Non Sq Epi: x / Bacteria: x      CAPILLARY BLOOD GLUCOSE      POCT Blood Glucose.: 73 mg/dL (22 Mar 2025 07:27)  POCT Blood Glucose.: 84 mg/dL (21 Mar 2025 22:54)    LIVER FUNCTIONS - ( 22 Mar 2025 05:30 )  Alb: 1.9 g/dL / Pro: 4.9 g/dL / ALK PHOS: 90 U/L / ALT: 12 U/L / AST: 14 U/L / GGT: x             Cultures:    RADIOLOGY & ADDITIONAL STUDIES:     ON:     POCUS: alines in anteriors/apices, no effusions at bases. LA 1.4, dry appearing with borderline MAPs given additional 1L LR bolus. Lytes repleted. patient refused ramires.  GI consulted. c diff & GI PCR ordered. Desat 88% while sleeping, 2L NC. CXR ordered. MAPs 55, spont improve 70s. Oliguric, , VTI 17, 500cc bolus, repeat VTI 22, BP improved to MAPs 80s, POCUS w/ alines throughout, 500cc bolus. UO imp. Daily alcohol use in question, son overheard by nursing questioning excessive ETOH use over phone, CIWA ordered.       SUBJECTIVE: Pt seen and examined at bedside this am by ICU team. Patient is lying comfortably in bed with no complaints. Tolerating diet, but now NPO since MN due to possible flex sig, pain well controlled with current regimen. Patient denies fever, nausea, vomiting, chest pain, and shortness of breath. Patient is passing gas and had 1 BM today morning at 7am that loose and mushy, not watery as prior.     MEDICATIONS  (STANDING):  cefTRIAXone   IVPB 2000 milliGRAM(s) IV Intermittent every 24 hours  chlorhexidine 2% Cloths 1 Application(s) Topical <User Schedule>  dextrose 5%. 1000 milliLiter(s) (50 mL/Hr) IV Continuous <Continuous>  dextrose 5%. 1000 milliLiter(s) (100 mL/Hr) IV Continuous <Continuous>  dextrose 50% Injectable 25 Gram(s) IV Push once  dextrose 50% Injectable 12.5 Gram(s) IV Push once  dextrose 50% Injectable 25 Gram(s) IV Push once  glucagon  Injectable 1 milliGRAM(s) IntraMuscular once  insulin lispro (ADMELOG) corrective regimen sliding scale   SubCutaneous three times a day before meals  lactated ringers. 1000 milliLiter(s) (120 mL/Hr) IV Continuous <Continuous>  metroNIDAZOLE  IVPB 500 milliGRAM(s) IV Intermittent every 8 hours  norepinephrine Infusion 0.05 MICROgram(s)/kG/Min (9.87 mL/Hr) IV Continuous <Continuous>  tamsulosin 0.4 milliGRAM(s) Oral at bedtime  vancomycin    Solution 125 milliGRAM(s) Oral every 6 hours    MEDICATIONS  (PRN):  dextrose Oral Gel 15 Gram(s) Oral once PRN Blood Glucose LESS THAN 70 milliGRAM(s)/deciliter  ondansetron Injectable 4 milliGRAM(s) IV Push every 6 hours PRN Nausea and/or Vomiting      Drips:     ICU Vital Signs Last 24 Hrs  T(C): 36.2 (22 Mar 2025 05:05), Max: 37.2 (21 Mar 2025 17:56)  T(F): 97.2 (22 Mar 2025 05:05), Max: 98.9 (21 Mar 2025 17:56)  HR: 92 (22 Mar 2025 06:18) (86 - 119)  BP: 117/72 (22 Mar 2025 06:18) (70/36 - 117/72)  BP(mean): 89 (22 Mar 2025 06:18) (56 - 89)  ABP: --  ABP(mean): --  RR: 22 (22 Mar 2025 06:18) (16 - 35)  SpO2: 98% (22 Mar 2025 06:18) (90% - 99%)    O2 Parameters below as of 22 Mar 2025 06:18  Patient On (Oxygen Delivery Method): nasal cannula w/ humidification  O2 Flow (L/min): 2      Physical Exam:  General: NAD  HEENT: NC/AT, EOMI, PERRLA, normal hearing, no oral lesions, neck supple w/o LAD  Pulmonary: Nonlabored breathing, no respiratory distress, CTA-B  Cardiovascular: NSR, no murmurs  Abdominal: soft, NT/ND, +BS, no organomegaly.   Extremities: WWP, 5/5 strength x 4, no clubbing/cyanosis/edema  Neuro: A/O x3, CNs II-XII grossly intact, normal motor/sensation, no focal deficits  Pulses: palpable distal pulses    Lines/tubes/drains:  Ramires:	      Vent settings:      I&O's Summary    21 Mar 2025 07:01  -  22 Mar 2025 07:00  --------------------------------------------------------  IN: 3081.6 mL / OUT: 400 mL / NET: 2681.6 mL        LABS:                        11.0   20.74 )-----------( 385      ( 22 Mar 2025 05:30 )             29.9     03-22    133[L]  |  108  |  20  ----------------------------<  78  3.5   |  14[L]  |  1.40[H]    Ca    7.2[L]      22 Mar 2025 05:30  Phos  2.2     03-22  Mg     1.7     03-22    TPro  4.9[L]  /  Alb  1.9[L]  /  TBili  0.5  /  DBili  0.3  /  AST  14  /  ALT  12  /  AlkPhos  90  03-22    PT/INR - ( 21 Mar 2025 21:24 )   PT: 17.4 sec;   INR: 1.52          PTT - ( 21 Mar 2025 21:24 )  PTT:27.6 sec  Urinalysis Basic - ( 22 Mar 2025 05:30 )    Color: x / Appearance: x / SG: x / pH: x  Gluc: 78 mg/dL / Ketone: x  / Bili: x / Urobili: x   Blood: x / Protein: x / Nitrite: x   Leuk Esterase: x / RBC: x / WBC x   Sq Epi: x / Non Sq Epi: x / Bacteria: x      CAPILLARY BLOOD GLUCOSE      POCT Blood Glucose.: 73 mg/dL (22 Mar 2025 07:27)  POCT Blood Glucose.: 84 mg/dL (21 Mar 2025 22:54)    LIVER FUNCTIONS - ( 22 Mar 2025 05:30 )  Alb: 1.9 g/dL / Pro: 4.9 g/dL / ALK PHOS: 90 U/L / ALT: 12 U/L / AST: 14 U/L / GGT: x             Cultures:    RADIOLOGY & ADDITIONAL STUDIES:

## 2025-03-22 NOTE — PROGRESS NOTE ADULT - ASSESSMENT
70 yo M PMH DM, HTN, hypothyroidism, presented to the ED on 3/18 c/o 2 weeks of voluminous watery diarrhea, discharged same day on augmentin. Represents 3/21 w/ CT findings of pancolitis and transverese colon of 8cm. Negative prior infectious work up, less likeli-mann of ishcemic and and possible inflammatory colitis. Given hypotension on presentation, on-going tachycardia, plan to admit patient to SICU for HD monitoring and close monitoring given electrolyte derangements, YOLI and ongoing fluid resuscitation     NEURO: IV Tylenol RTC; Zofran PRN  CV: MAPs >65; HTN Olmesartan 5mg (on hold); HLD: Rosuvastatin (hold)  PULM: Desat to 88 ON, 2L NC, IS/OOB  GI/FEN: CLD; Colitis of unkown origin - transverse colon 8cm   : Voiding freely. Strict I&Os; YOLI on CKD baseline 1.3, 1.8 on admission; BPH - on Tamsulosin 0.4mg   ENDO: ISS; On Munjaro (last dose 3/15/25); Testosterone 60mg IM (last dose 3/18) and anastazole(prevents peripheral conversion testosterone to estrogen) last dose 1 week ago  ID: PO Vanco 125 q6 until cdiff cx prove negative; CFTX 2g q24; Flagyl 500 q8 (3/21 - )  PPX: SCDs; SQH   LINES: PIVs,   WOUNDS/DRAINS: none     INCOMPLETE!!!!!!   70 yo M PMH DM, HTN, hypothyroidism, presented to the ED on 3/18 c/o 2 weeks of voluminous watery diarrhea, discharged same day on Augmentin Represents 3/21 w/ CT findings of pancolitis and transverse colon of 8cm. Negative prior infectious work up, less likelihood of ischemic and and possible inflammatory colitis. Given hypotension on presentation, on-going tachycardia, plan to admit patient to SICU for HD monitoring and close monitoring given electrolyte derangements, YOLI and ongoing fluid resuscitation. Continues to have poor urine output despite 7L fluid resuscitation, YOLI improving however, working up for possible adrenal crisis, pending AM cortisol.     NEURO: IV Tylenol RTC; Zofran PRN  CV: MAPs >65; HTN Olmesartan 5mg (on hold); HLD: Rosuvastatin (hold)  PULM: Desat to 88 ON, 2L NC, IS/OOB   GI/FEN: NPO for Flex sig; Colitis of unknown origin - transverse colon 8cm   : Voiding freely. Strict I&Os; Improved YOLI (CKD 1.3, 1.8) on admission; BPH - Started Tamsulosin 0.4mg   ENDO: ISS; On Munjaro (last dose 3/15/25); Testosterone 60mg IM (last dose 3/18) and Anastrazole (prevents peripheral conversion testosterone to estrogen) last dose 1 week ago. AM Cortisol pending   ID: CFTX 2g q24; Flagyl 500 q8 (3/21- ); PO Vanc stopped (3/21-3/22), C diff negative  PPX: SCDs; SQH on hold for flex sig  LINES: PIVs,   WOUNDS/DRAINS: none      70 yo M PMH DM, HTN, hypothyroidism, presented to the ED on 3/18 c/o 2 weeks of voluminous watery diarrhea, discharged same day on Augmentin Represents 3/21 w/ CT findings of pancolitis and transverse colon of 8cm. Negative prior infectious work up, less likelihood of ischemic and and possible inflammatory colitis. Given hypotension on presentation, on-going tachycardia, plan to admit patient to SICU for HD monitoring and close monitoring given electrolyte derangements, YOLI and ongoing fluid resuscitation. Continues to have poor urine output despite 7L fluid resuscitation, YOLI improving however, working up for possible adrenal crisis, pending AM cortisol.     NEURO: IV Tylenol RTC; Zofran PRN  CV: MAPs >65; HTN Olmesartan 5mg (on hold); HLD: Rosuvastatin (hold)  PULM: Desat to 88 ON, 2L NC, IS/OOB   GI/FEN: NPO for Flex sig; Colitis of unknown origin - transverse colon 8cm   : Voiding freely. Strict I&Os; Improved YOLI (CKD 1.3, 1.8) on admission; BPH - Started Tamsulosin 0.4mg   ENDO: ISS; On Munjaro (last dose 3/15/25); Testosterone 60mg IM (last dose 3/18) and Anastrazole (prevents peripheral conversion testosterone to estrogen) last dose 1 week ago. AM Cortisol pending   ID: CFTX 2g q24; Flagyl 500 q8 (3/21- ); PO Vanc stopped (3/21-3/22), C diff negative; Entameba histolytica indeterminate   PPX: SCDs; SQH on hold for flex sig  LINES: PIVs,   WOUNDS/DRAINS: none      68 yo M PMH DM, HTN, hypothyroidism, presented to the ED on 3/18 c/o 2 weeks of voluminous watery diarrhea, discharged same day on Augmentin Represents 3/21 w/ CT findings of pancolitis and transverse colon of 8cm. Negative prior infectious work up, less likelihood of ischemic and and possible inflammatory colitis. Given hypotension on presentation, on-going tachycardia, plan to admit patient to SICU for HD monitoring and close monitoring given electrolyte derangements, YOLI and ongoing fluid resuscitation. Continues to have poor urine output despite 7L fluid resuscitation, YOLI improving however, continues to have poor urine output, not extrinsic given normal bladder distension working up for possible adrenal crisis, pending AM cortisol.     NEURO: IV Tylenol RTC; Zofran PRN  CV: MAPs >65; HTN Olmesartan 5mg (on hold); HLD: Rosuvastatin (hold)  PULM: Desat to 88 ON, 2L NC, IS/OOB   GI/FEN: NPO for Flex sig; Colitis of unknown origin - transverse colon 8cm   : Voiding freely. Strict I&Os; Improved YOLI (CKD 1.3, 1.8) on admission; BPH - Started Tamsulosin 0.4mg   ENDO: ISS; On Munjaro (last dose 3/15/25); Testosterone 60mg IM (last dose 3/18) and Anastrazole (prevents peripheral conversion testosterone to estrogen) last dose 1 week ago. AM Cortisol pending   ID: CFTX 2g q24; Flagyl 500 q8 (3/21- ); PO Vanc stopped (3/21-3/22), C diff negative; Entameba histolytica indeterminate   PPX: SCDs; SQH on hold for flex sig  LINES: PIVs,   WOUNDS/DRAINS: none

## 2025-03-22 NOTE — CONSULT NOTE ADULT - ATTENDING COMMENTS
2 weeks diarrhea non responsive to course atbx suggests parasitic cause.  Crypto can be non detectable on pcr.  Amoeba michael positive given study.  Today his stooling better.  Would stay the course and advance diet.  If worse again, Would add oral anti protozoal (alinia). Would not anticipate need for colonoscopy if continues to be improved.  thanks

## 2025-03-23 LAB
ANION GAP SERPL CALC-SCNC: 10 MMOL/L — SIGNIFICANT CHANGE UP (ref 5–17)
BUN SERPL-MCNC: 13 MG/DL — SIGNIFICANT CHANGE UP (ref 7–23)
CALCIUM SERPL-MCNC: 7.4 MG/DL — LOW (ref 8.4–10.5)
CHLORIDE SERPL-SCNC: 112 MMOL/L — HIGH (ref 96–108)
CO2 SERPL-SCNC: 17 MMOL/L — LOW (ref 22–31)
CREAT SERPL-MCNC: 1.19 MG/DL — SIGNIFICANT CHANGE UP (ref 0.5–1.3)
CRP SERPL-MCNC: 152.5 MG/L — HIGH (ref 0–4)
EGFR: 65 ML/MIN/1.73M2 — SIGNIFICANT CHANGE UP
EGFR: 65 ML/MIN/1.73M2 — SIGNIFICANT CHANGE UP
ERYTHROCYTE [SEDIMENTATION RATE] IN BLOOD: 23 MM/HR — HIGH
GLUCOSE SERPL-MCNC: 86 MG/DL — SIGNIFICANT CHANGE UP (ref 70–99)
HCT VFR BLD CALC: 32 % — LOW (ref 39–50)
HGB BLD-MCNC: 11.9 G/DL — LOW (ref 13–17)
MAGNESIUM SERPL-MCNC: 2 MG/DL — SIGNIFICANT CHANGE UP (ref 1.6–2.6)
MCHC RBC-ENTMCNC: 30.8 PG — SIGNIFICANT CHANGE UP (ref 27–34)
MCHC RBC-ENTMCNC: 37.2 G/DL — HIGH (ref 32–36)
MCV RBC AUTO: 82.9 FL — SIGNIFICANT CHANGE UP (ref 80–100)
NRBC BLD AUTO-RTO: 0 /100 WBCS — SIGNIFICANT CHANGE UP (ref 0–0)
PHOSPHATE SERPL-MCNC: 3 MG/DL — SIGNIFICANT CHANGE UP (ref 2.5–4.5)
PLATELET # BLD AUTO: 435 K/UL — HIGH (ref 150–400)
POTASSIUM SERPL-MCNC: 3.2 MMOL/L — LOW (ref 3.5–5.3)
POTASSIUM SERPL-SCNC: 3.2 MMOL/L — LOW (ref 3.5–5.3)
RBC # BLD: 3.86 M/UL — LOW (ref 4.2–5.8)
RBC # FLD: 13.7 % — SIGNIFICANT CHANGE UP (ref 10.3–14.5)
SODIUM SERPL-SCNC: 139 MMOL/L — SIGNIFICANT CHANGE UP (ref 135–145)
WBC # BLD: 10.29 K/UL — SIGNIFICANT CHANGE UP (ref 3.8–10.5)
WBC # FLD AUTO: 10.29 K/UL — SIGNIFICANT CHANGE UP (ref 3.8–10.5)

## 2025-03-23 PROCEDURE — 99233 SBSQ HOSP IP/OBS HIGH 50: CPT | Mod: GC

## 2025-03-23 PROCEDURE — 99232 SBSQ HOSP IP/OBS MODERATE 35: CPT

## 2025-03-23 PROCEDURE — 99232 SBSQ HOSP IP/OBS MODERATE 35: CPT | Mod: GC

## 2025-03-23 RX ADMIN — HEPARIN SODIUM 5000 UNIT(S): 1000 INJECTION INTRAVENOUS; SUBCUTANEOUS at 21:17

## 2025-03-23 RX ADMIN — CEFTRIAXONE 100 MILLIGRAM(S): 500 INJECTION, POWDER, FOR SOLUTION INTRAMUSCULAR; INTRAVENOUS at 16:07

## 2025-03-23 RX ADMIN — HEPARIN SODIUM 5000 UNIT(S): 1000 INJECTION INTRAVENOUS; SUBCUTANEOUS at 05:38

## 2025-03-23 RX ADMIN — HEPARIN SODIUM 5000 UNIT(S): 1000 INJECTION INTRAVENOUS; SUBCUTANEOUS at 14:20

## 2025-03-23 RX ADMIN — SODIUM CHLORIDE 120 MILLILITER(S): 9 INJECTION, SOLUTION INTRAVENOUS at 05:37

## 2025-03-23 RX ADMIN — Medication 160 MILLIGRAM(S): at 05:37

## 2025-03-23 RX ADMIN — Medication 160 MILLIGRAM(S): at 11:03

## 2025-03-23 RX ADMIN — SODIUM CHLORIDE 120 MILLILITER(S): 9 INJECTION, SOLUTION INTRAVENOUS at 10:12

## 2025-03-23 RX ADMIN — Medication 1 APPLICATION(S): at 05:37

## 2025-03-23 RX ADMIN — Medication 160 MILLIGRAM(S): at 23:30

## 2025-03-23 RX ADMIN — Medication 40 MILLIEQUIVALENT(S): at 10:12

## 2025-03-23 RX ADMIN — TAMSULOSIN HYDROCHLORIDE 0.4 MILLIGRAM(S): 0.4 CAPSULE ORAL at 21:17

## 2025-03-23 RX ADMIN — Medication 160 MILLIGRAM(S): at 00:29

## 2025-03-23 RX ADMIN — Medication 160 MILLIGRAM(S): at 18:50

## 2025-03-23 RX ADMIN — Medication 5 MILLIGRAM(S): at 21:18

## 2025-03-23 NOTE — PROGRESS NOTE ADULT - ASSESSMENT
70 yo M PMH DM, HTN, hypothyroidism, presented to the ED on 3/18 c/o 2 weeks of voluminous watery diarrhea, discharged same day on Augmentin Represents 3/21 w/ CT findings of pancolitis and transverse colon of 8cm. Negative prior infectious work up, less likelihood of ischemic and and possible inflammatory colitis. Given hypotension on presentation, on-going tachycardia, plan to admit patient to SICU for HD monitoring and close monitoring given electrolyte derangements, YOLI and ongoing fluid resuscitation. Continues to have poor urine output despite 7L fluid resuscitation, YOLI resolved however, continues to have poor urine output, no extrinsic issues.     F/u GI recs  F/u ID recs  rest of care per SICU

## 2025-03-23 NOTE — PROGRESS NOTE ADULT - ASSESSMENT
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 Patient describes watery diarrhea development approximately 2 weeks prior to this presentation. States the diarrhea is "watery and yellow" without obvious solid or semiformed brown stool. States this occurs between 12-20 times per day.     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 Would expect hematochezia or prodromal complaints in the classic presentation of inflammatory bowel disease. Would expect pathogen identification of GI PCR as well as fever or hematochezia if infectious. However, certainly possible for this to be an uncommon presentation of either of the above pathologies. Given the improvement in symptoms after initiation of antibiotic therapy known to cover protozoan organisms, possible patient with protozoan infection.   - Currently receiving metronidazole, ceftriaxone. Can continue per primary team  - Can advance diet as tolerated  - Resuscitation per primary team     Heber Courtney DO  Gastroenterology Fellow  GI Consult Pager Weekdays 7am-5pm: [phone]  Weeknights/Weekend/Holiday Coverage: Please Call the  for Contact Information  Follow Up Questions Welcome via [place] [employer] Teams Messenger

## 2025-03-23 NOTE — DIETITIAN INITIAL EVALUATION ADULT - NSFNSGIIOFT_GEN_A_CORE
03-22-25 @ 07:01  -  03-23-25 @ 07:00  --------------------------------------------------------  OUT:    Stool (mL): 1525 mL  Total OUT: 1525 mL    Total NET: -1525 mL

## 2025-03-23 NOTE — PROGRESS NOTE ADULT - SUBJECTIVE AND OBJECTIVE BOX
GASTROENTEROLOGY PROGRESS NOTE    INTERVAL/SUBJECTIVE:  Doing well today. Describes 2 loose bowel movements, which is significant improvement compared to pre admission 12-20.     Allergies    No Known Allergies    Intolerances      MEDICATIONS:  MEDICATIONS  (STANDING):  Myrtle Thyroid 180 milliGRAM(s) 1 Tablet(s) Oral daily  cefTRIAXone   IVPB 2000 milliGRAM(s) IV Intermittent every 24 hours  cetirizine 5 milliGRAM(s) Oral daily  chlorhexidine 2% Cloths 1 Application(s) Topical <User Schedule>  dextrose 5%. 1000 milliLiter(s) (100 mL/Hr) IV Continuous <Continuous>  dextrose 50% Injectable 25 Gram(s) IV Push once  heparin   Injectable 5000 Unit(s) SubCutaneous every 8 hours  insulin lispro (ADMELOG) corrective regimen sliding scale   SubCutaneous three times a day before meals  metroNIDAZOLE  IVPB 800 milliGRAM(s) IV Intermittent every 6 hours  tamsulosin 0.4 milliGRAM(s) Oral at bedtime    MEDICATIONS  (PRN):  artificial tears (preservative free) Ophthalmic Solution 1 Drop(s) Both EYES every 6 hours PRN Dry Eyes  Brimonidine 0.025% 1 Drop(s) 1 Drop(s) Both EYES every 6 hours PRN dry eyes and/or redness  ondansetron Injectable 4 milliGRAM(s) IV Push every 6 hours PRN Nausea and/or Vomiting    Vital Signs Last 24 Hrs  T(C): 36.4 (23 Mar 2025 13:11), Max: 36.6 (22 Mar 2025 21:55)  T(F): 97.5 (23 Mar 2025 13:11), Max: 97.9 (22 Mar 2025 21:55)  HR: 87 (23 Mar 2025 17:00) (74 - 98)  BP: 109/67 (23 Mar 2025 17:00) (82/50 - 124/65)  BP(mean): 84 (23 Mar 2025 17:00) (60 - 88)  RR: 29 (23 Mar 2025 17:00) (14 - 33)  SpO2: 98% (23 Mar 2025 17:00) (90% - 98%)    Parameters below as of 23 Mar 2025 18:00  Patient On (Oxygen Delivery Method): room air        03-22 @ 07:01  -  03-23 @ 07:00  --------------------------------------------------------  IN: 4113.1 mL / OUT: 2800 mL / NET: 1313.1 mL    03-23 @ 07:01  -  03-23 @ 18:05  --------------------------------------------------------  IN: 1600 mL / OUT: 275 mL / NET: 1325 mL        General: Elderly male, sitting in chair, well developed, well nourished, in no acute distress  Eyes: Anicteric sclerae, moist conjunctivae, extraocular motions intact, pupils equal round and reactive to light  HENT: Pink and moist mucous membranes, good dentition, tongue normal in appearance without lesions and has symmetrical movement, no obvious oral lesions noted, pharynx normal without tonsillar swelling or exudates  Abdomen: Symmetric appearing, no obvious lesions, non-distended, normal bowel sounds, soft, non-tender to palpation, no rebound or guarding  Skin: Warm and dry, no obvious rash, no jaundice      LABS:                        11.9   10.29 )-----------( 435      ( 23 Mar 2025 05:30 )             32.0     03-23    139  |  112[H]  |  13  ----------------------------<  86  3.2[L]   |  17[L]  |  1.19    Ca    7.4[L]      23 Mar 2025 05:30  Phos  3.0     03-23  Mg     2.0     03-23    TPro  4.9[L]  /  Alb  1.9[L]  /  TBili  0.5  /  DBili  0.3  /  AST  14  /  ALT  12  /  AlkPhos  90  03-22    PT/INR - ( 21 Mar 2025 21:24 )   PT: 17.4 sec;   INR: 1.52          PTT - ( 21 Mar 2025 21:24 )  PTT:27.6 sec    RADIOLOGY & ADDITIONAL STUDIES: Reviewed

## 2025-03-23 NOTE — PROGRESS NOTE ADULT - SUBJECTIVE AND OBJECTIVE BOX
INTERVAL/OVERNIGHT EVENTS: Patient with continued labile pressures with MAPs as low 62, though always with improvement shortly after. Morning cortisol wnl. Continue ceftriaxone and increased flagyl dose for enatmoeba histolytica coverage given indeterminate finding on GI PCR. Continued watery diarrhea, though becoming less frequent and less voluminous.     SUBJECTIVE: Patient seen in good spirits this am in chair at bedside. States he feels much improved from prior. Denies headache, nausea, chills, chest pain, shortness of breath, abdominal pain, extremity pain/weakness      Neurologic Medications  ondansetron Injectable 4 milliGRAM(s) IV Push every 6 hours PRN Nausea and/or Vomiting    Respiratory Medications  cetirizine 5 milliGRAM(s) Oral daily    Gastrointestinal Medications  dextrose 5%. 1000 milliLiter(s) IV Continuous <Continuous>  lactated ringers. 1000 milliLiter(s) IV Continuous <Continuous>    Genitourinary Medications  tamsulosin 0.4 milliGRAM(s) Oral at bedtime    Hematologic/Oncologic Medications  heparin   Injectable 5000 Unit(s) SubCutaneous every 8 hours    Antimicrobial/Immunologic Medications  cefTRIAXone   IVPB 2000 milliGRAM(s) IV Intermittent every 24 hours  metroNIDAZOLE  IVPB 800 milliGRAM(s) IV Intermittent every 6 hours    Endocrine/Metabolic Medications  dextrose 50% Injectable 25 Gram(s) IV Push once  insulin lispro (ADMELOG) corrective regimen sliding scale   SubCutaneous three times a day before meals    Topical/Other Medications  [place] Thyroid 180 milliGRAM(s) 1 Tablet(s) Oral daily  artificial tears (preservative free) Ophthalmic Solution 1 Drop(s) Both EYES every 6 hours PRN Dry Eyes  Brimonidine 0.025% 1 Drop(s) 1 Drop(s) Both EYES every 6 hours PRN dry eyes and/or redness  chlorhexidine 2% Cloths 1 Application(s) Topical <User Schedule>      MEDICATIONS  (PRN):  artificial tears (preservative free) Ophthalmic Solution 1 Drop(s) Both EYES every 6 hours PRN Dry Eyes  Brimonidine 0.025% 1 Drop(s) 1 Drop(s) Both EYES every 6 hours PRN dry eyes and/or redness  ondansetron Injectable 4 milliGRAM(s) IV Push every 6 hours PRN Nausea and/or Vomiting      I&O's Detail    22 Mar 2025 07:01  -  23 Mar 2025 07:00  --------------------------------------------------------  IN:    IV PiggyBack: 583.1 mL    IV PiggyBack: 150 mL    Lactated Ringers: 2880 mL    Oral Fluid: 500 mL  Total IN: 4113.1 mL    OUT:    Stool (mL): 1525 mL    Voided (mL): 1275 mL  Total OUT: 2800 mL    Total NET: 1313.1 mL      23 Mar 2025 07:01  -  23 Mar 2025 10:34  --------------------------------------------------------  IN:    Lactated Ringers: 360 mL  Total IN: 360 mL    OUT:    Voided (mL): 100 mL  Total OUT: 100 mL    Total NET: 260 mL          Vital Signs Last 24 Hrs  T(C): 36.4 (23 Mar 2025 09:06), Max: 36.7 (22 Mar 2025 17:24)  T(F): 97.5 (23 Mar 2025 09:06), Max: 98.1 (22 Mar 2025 17:24)  HR: 81 (23 Mar 2025 10:00) (74 - 100)  BP: 100/61 (23 Mar 2025 10:00) (82/50 - 119/62)  BP(mean): 75 (23 Mar 2025 10:00) (60 - 81)  RR: 23 (23 Mar 2025 10:00) (14 - 34)  SpO2: 97% (23 Mar 2025 10:00) (90% - 98%)    Parameters below as of 23 Mar 2025 11:00  Patient On (Oxygen Delivery Method): room air        GENERAL: NAD, resting comfortably in bed  HEENT: NCAT, MMM  C/V: Normal rate, regular rhythm without murmurs, rubs, or gallops.   PULM: Nonlabored breathing, no respiratory distress, lungs clear to auscultation bilaterally  ABD: Soft, ND, NT, no rebound tenderness, no guarding  EXTREM: WWP, LUE edema secondary to IV infiltration, otherwise no edema, SCDs in place  NEURO: No focal deficits    LABS:                        11.9   10.29 )-----------( 435      ( 23 Mar 2025 05:30 )             32.0     03-23    139  |  112[H]  |  13  ----------------------------<  86  3.2[L]   |  17[L]  |  1.19    Ca    7.4[L]      23 Mar 2025 05:30  Phos  3.0     03-23  Mg     2.0     03-23    TPro  4.9[L]  /  Alb  1.9[L]  /  TBili  0.5  /  DBili  0.3  /  AST  14  /  ALT  12  /  AlkPhos  90  03-22    PT/INR - ( 21 Mar 2025 21:24 )   PT: 17.4 sec;   INR: 1.52          PTT - ( 21 Mar 2025 21:24 )  PTT:27.6 sec  Urinalysis Basic - ( 23 Mar 2025 05:30 )    Color: x / Appearance: x / SG: x / pH: x  Gluc: 86 mg/dL / Ketone: x  / Bili: x / Urobili: x   Blood: x / Protein: x / Nitrite: x   Leuk Esterase: x / RBC: x / WBC x   Sq Epi: x / Non Sq Epi: x / Bacteria: x        RADIOLOGY & ADDITIONAL STUDIES:      Culture - Blood (collected 03-21-25 @ 14:42)  Source: Blood Blood-Peripheral  Preliminary Report (03-22-25 @ 19:01):    No growth at 24 hours    Culture - Blood (collected 03-21-25 @ 14:42)  Source: Blood Blood-Peripheral  Preliminary Report (03-22-25 @ 19:01):    No growth at 24 hours    Urinalysis with Rflx Culture (collected 03-21-25 @ 12:04)
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
## 2025-03-23 NOTE — DIETITIAN INITIAL EVALUATION ADULT - PERTINENT LABORATORY DATA
03-23    139  |  112[H]  |  13  ----------------------------<  86  3.2[L]   |  17[L]  |  1.19    Ca    7.4[L]      23 Mar 2025 05:30  Phos  3.0     03-23  Mg     2.0     03-23    TPro  4.9[L]  /  Alb  1.9[L]  /  TBili  0.5  /  DBili  0.3  /  AST  14  /  ALT  12  /  AlkPhos  90  03-22  POCT Blood Glucose.: 89 mg/dL (03-23-25 @ 11:10)

## 2025-03-23 NOTE — DIETITIAN INITIAL EVALUATION ADULT - PERSON TAUGHT/METHOD
Pt and family amenable to education; RD provided education in regards to the importance of adequate macro and micronutrients, as well as hydration to support ADLs, maintain energy levels and overall functional/nutritional status. General healthful education provided. Nutrient-dense foods promoted once pt's diet is advanced. Potential future diet advancement plan discussed. Pt and family appeared overall receptive and verbalized understanding./verbal instruction/patient instructed/spouse instructed/daughter instructed

## 2025-03-23 NOTE — DIETITIAN INITIAL EVALUATION ADULT - ADD RECOMMEND
1. Clear Liquids Diet  **as medically feasible and tolerated, advance diet to regular versus low fiber diet**  **Provide additional nourishments and/or oral nutrition supplements per pt preferences and/or if pt's PO intakes are consistently <50%**  2. Encourage pt to meet nutritional needs as able  3. Monitor PO intakes, trend weights (weekly), monitor skin integrity, monitor labs (electrolytes, CMP), monitor GI function  4. Encourage adherence to diet education (reinforce as able)  5. Continue insulin regimen to promote euglycemia  6. Pain and bowel regimen per team  7. Will continue to assess/honor preferences as able   8. Align nutrition interventions with goals of care at all times

## 2025-03-23 NOTE — DIETITIAN INITIAL EVALUATION ADULT - OTHER CALCULATIONS
Pt is >100% ideal body weight, thus ideal body weight used for all calculations. Needs adjusted for advanced age and physiological demands, ICU.

## 2025-03-23 NOTE — DIETITIAN INITIAL EVALUATION ADULT - PERTINENT MEDS FT
MEDICATIONS  (STANDING):  [place] Thyroid 180 milliGRAM(s) 1 Tablet(s) Oral daily  cefTRIAXone   IVPB 2000 milliGRAM(s) IV Intermittent every 24 hours  cetirizine 5 milliGRAM(s) Oral daily  chlorhexidine 2% Cloths 1 Application(s) Topical <User Schedule>  dextrose 5%. 1000 milliLiter(s) (100 mL/Hr) IV Continuous <Continuous>  dextrose 50% Injectable 25 Gram(s) IV Push once  heparin   Injectable 5000 Unit(s) SubCutaneous every 8 hours  insulin lispro (ADMELOG) corrective regimen sliding scale   SubCutaneous three times a day before meals  lactated ringers. 1000 milliLiter(s) (120 mL/Hr) IV Continuous <Continuous>  metroNIDAZOLE  IVPB 800 milliGRAM(s) IV Intermittent every 6 hours  tamsulosin 0.4 milliGRAM(s) Oral at bedtime    MEDICATIONS  (PRN):  artificial tears (preservative free) Ophthalmic Solution 1 Drop(s) Both EYES every 6 hours PRN Dry Eyes  Brimonidine 0.025% 1 Drop(s) 1 Drop(s) Both EYES every 6 hours PRN dry eyes and/or redness  ondansetron Injectable 4 milliGRAM(s) IV Push every 6 hours PRN Nausea and/or Vomiting

## 2025-03-23 NOTE — PROGRESS NOTE ADULT - SUBJECTIVE AND OBJECTIVE BOX
INTERVAL HPI/OVERNIGHT EVENTS: No acute events overnight    SUBJECTIVE: Pt seen and examined at bedside this am by surgery team. He is having decreasing volume of diarrhea. He is tolerating clear liquids without nausea or vomiting. His pain is better.    MEDICATIONS  (STANDING):  [place] Thyroid 180 milliGRAM(s) 1 Tablet(s) Oral daily  cefTRIAXone   IVPB 2000 milliGRAM(s) IV Intermittent every 24 hours  cetirizine 5 milliGRAM(s) Oral daily  chlorhexidine 2% Cloths 1 Application(s) Topical <User Schedule>  dextrose 5%. 1000 milliLiter(s) (100 mL/Hr) IV Continuous <Continuous>  dextrose 50% Injectable 25 Gram(s) IV Push once  heparin   Injectable 5000 Unit(s) SubCutaneous every 8 hours  insulin lispro (ADMELOG) corrective regimen sliding scale   SubCutaneous three times a day before meals  lactated ringers. 1000 milliLiter(s) (120 mL/Hr) IV Continuous <Continuous>  metroNIDAZOLE  IVPB 800 milliGRAM(s) IV Intermittent every 6 hours  potassium chloride   Powder 40 milliEquivalent(s) Oral once  tamsulosin 0.4 milliGRAM(s) Oral at bedtime    MEDICATIONS  (PRN):  artificial tears (preservative free) Ophthalmic Solution 1 Drop(s) Both EYES every 6 hours PRN Dry Eyes  Brimonidine 0.025% 1 Drop(s) 1 Drop(s) Both EYES every 6 hours PRN dry eyes and/or redness  ondansetron Injectable 4 milliGRAM(s) IV Push every 6 hours PRN Nausea and/or Vomiting      Vital Signs Last 24 Hrs  T(C): 35.3 (23 Mar 2025 04:00), Max: 36.7 (22 Mar 2025 17:24)  T(F): 95.6 (23 Mar 2025 04:00), Max: 98.1 (22 Mar 2025 17:24)  HR: 87 (23 Mar 2025 08:00) (74 - 100)  BP: 101/60 (23 Mar 2025 08:00) (82/50 - 119/62)  BP(mean): 75 (23 Mar 2025 08:00) (60 - 81)  RR: 20 (23 Mar 2025 08:00) (13 - 34)  SpO2: 96% (23 Mar 2025 08:00) (90% - 98%)    Parameters below as of 23 Mar 2025 08:00  Patient On (Oxygen Delivery Method): room air        Physical Exam:  Constitutional: A&Ox3, NAD  Respiratory: normal work of breathing  Cardiovascular: NSR, RRR  Abdomen: soft, non-tender, non distended, no rebound or guarding  Legs: WWP, SCDs in place, no calf tenderness        I&O's Detail    22 Mar 2025 07:01  -  23 Mar 2025 07:00  --------------------------------------------------------  IN:    IV PiggyBack: 583.1 mL    IV PiggyBack: 150 mL    Lactated Ringers: 2880 mL    Oral Fluid: 500 mL  Total IN: 4113.1 mL    OUT:    Stool (mL): 1525 mL    Voided (mL): 1275 mL  Total OUT: 2800 mL    Total NET: 1313.1 mL      23 Mar 2025 07:01  -  23 Mar 2025 08:29  --------------------------------------------------------  IN:    Lactated Ringers: 120 mL  Total IN: 120 mL    OUT:  Total OUT: 0 mL    Total NET: 120 mL          LABS:                        11.9   10.29 )-----------( 435      ( 23 Mar 2025 05:30 )             32.0     03-23    139  |  112[H]  |  13  ----------------------------<  86  3.2[L]   |  17[L]  |  1.19    Ca    7.4[L]      23 Mar 2025 05:30  Phos  3.0     03-23  Mg     2.0     03-23    TPro  4.9[L]  /  Alb  1.9[L]  /  TBili  0.5  /  DBili  0.3  /  AST  14  /  ALT  12  /  AlkPhos  90  03-22    PT/INR - ( 21 Mar 2025 21:24 )   PT: 17.4 sec;   INR: 1.52          PTT - ( 21 Mar 2025 21:24 )  PTT:27.6 sec  Urinalysis Basic - ( 23 Mar 2025 05:30 )    Color: x / Appearance: x / SG: x / pH: x  Gluc: 86 mg/dL / Ketone: x  / Bili: x / Urobili: x   Blood: x / Protein: x / Nitrite: x   Leuk Esterase: x / RBC: x / WBC x   Sq Epi: x / Non Sq Epi: x / Bacteria: x        RADIOLOGY & ADDITIONAL STUDIES:

## 2025-03-23 NOTE — PROGRESS NOTE ADULT - ASSESSMENT
68 yo M PMH DM, HTN, hypothyroidism, presented to the ED on 3/18 c/o 2 weeks of voluminous watery diarrhea, discharged same day on Augmentin re-presents 3/21 w/ CT findings of pancolitis and transverse colon of 8cm. Negative prior infectious work up, less likelihood of ischemic and and possible inflammatory colitis. Given hypotension on presentation, on-going tachycardia, plan to admit patient to SICU for HD monitoring and close monitoring given electrolyte derangements, YOLI and ongoing fluid resuscitation. Continues to have poor urine output despite 7L fluid resuscitation, YOLI resolved however, continues to have poor urine output, no extrinsic issues.     NEURO: IV Tylenol RTC; Zofran PRN; Cetrizine 5mg qd for allergies  HEENT: Lumify Eye drops q6 hrs prn  CV: MAPs >65; HTN Olmesartan 5mg (on hold); HLD: Rosuvastatin (hold)  PULM: Saturating well on RA, IS/OOB. Has required 2L NC while asleep  GI/FEN: CLD; Colitis of unknown origin - transverse colon 8cm, GI PCR indeterminate entamoeba histolytica treating empirically  : Voiding freely. Strict I&Os; Improved YOLI (CKD 1.3 <1.8) on admission; BPH - Cont Tamsulosin 0.4mg   ENDO: ISS; On Mounjaro (last dose 3/15/25); Testosterone 60mg IM (last dose 3/18) and Anastrazole (prevents peripheral conversion testosterone to estrogen) last dose 1 week ago. AM Cortisol wnl. Cont [place] Thyroid 180 qd.   ID:  Entamoeba histolytica indeterminate; CFTX 2g q24(3/21- ); Flagyl 800 q6 (3/22 -); //DC'd: Flagyl 500 q8 (3/21-3/22), PO Vanc stopped (3/21-3/22), C diff negative; -ve Cryptosporidium   PPX: SCDs; SQH   LINES: PIVs,   WOUNDS/DRAINS: none

## 2025-03-24 ENCOUNTER — TRANSCRIPTION ENCOUNTER (OUTPATIENT)
Age: 70
End: 2025-03-24

## 2025-03-24 VITALS — TEMPERATURE: 98 F

## 2025-03-24 LAB
ANION GAP SERPL CALC-SCNC: 7 MMOL/L — SIGNIFICANT CHANGE UP (ref 5–17)
BUN SERPL-MCNC: 8 MG/DL — SIGNIFICANT CHANGE UP (ref 7–23)
CALCIUM SERPL-MCNC: 7.7 MG/DL — LOW (ref 8.4–10.5)
CHLORIDE SERPL-SCNC: 112 MMOL/L — HIGH (ref 96–108)
CO2 SERPL-SCNC: 18 MMOL/L — LOW (ref 22–31)
CREAT SERPL-MCNC: 1.13 MG/DL — SIGNIFICANT CHANGE UP (ref 0.5–1.3)
EGFR: 69 ML/MIN/1.73M2 — SIGNIFICANT CHANGE UP
EGFR: 69 ML/MIN/1.73M2 — SIGNIFICANT CHANGE UP
GLUCOSE SERPL-MCNC: 94 MG/DL — SIGNIFICANT CHANGE UP (ref 70–99)
HCT VFR BLD CALC: 32.5 % — LOW (ref 39–50)
HGB BLD-MCNC: 12.1 G/DL — LOW (ref 13–17)
MAGNESIUM SERPL-MCNC: 1.8 MG/DL — SIGNIFICANT CHANGE UP (ref 1.6–2.6)
MCHC RBC-ENTMCNC: 30.9 PG — SIGNIFICANT CHANGE UP (ref 27–34)
MCHC RBC-ENTMCNC: 37.2 G/DL — HIGH (ref 32–36)
MCV RBC AUTO: 82.9 FL — SIGNIFICANT CHANGE UP (ref 80–100)
NRBC BLD AUTO-RTO: 0 /100 WBCS — SIGNIFICANT CHANGE UP (ref 0–0)
PHOSPHATE SERPL-MCNC: 2.9 MG/DL — SIGNIFICANT CHANGE UP (ref 2.5–4.5)
PLATELET # BLD AUTO: 489 K/UL — HIGH (ref 150–400)
POTASSIUM SERPL-MCNC: 3.3 MMOL/L — LOW (ref 3.5–5.3)
POTASSIUM SERPL-SCNC: 3.3 MMOL/L — LOW (ref 3.5–5.3)
RBC # BLD: 3.92 M/UL — LOW (ref 4.2–5.8)
RBC # FLD: 14.1 % — SIGNIFICANT CHANGE UP (ref 10.3–14.5)
SODIUM SERPL-SCNC: 137 MMOL/L — SIGNIFICANT CHANGE UP (ref 135–145)
WBC # BLD: 6.81 K/UL — SIGNIFICANT CHANGE UP (ref 3.8–10.5)
WBC # FLD AUTO: 6.81 K/UL — SIGNIFICANT CHANGE UP (ref 3.8–10.5)

## 2025-03-24 PROCEDURE — 74174 CTA ABD&PLVS W/CONTRAST: CPT | Mod: MC

## 2025-03-24 PROCEDURE — 99291 CRITICAL CARE FIRST HOUR: CPT

## 2025-03-24 PROCEDURE — 84480 ASSAY TRIIODOTHYRONINE (T3): CPT

## 2025-03-24 PROCEDURE — 83605 ASSAY OF LACTIC ACID: CPT

## 2025-03-24 PROCEDURE — 84436 ASSAY OF TOTAL THYROXINE: CPT

## 2025-03-24 PROCEDURE — P9045: CPT

## 2025-03-24 PROCEDURE — 87507 IADNA-DNA/RNA PROBE TQ 12-25: CPT

## 2025-03-24 PROCEDURE — 85027 COMPLETE CBC AUTOMATED: CPT

## 2025-03-24 PROCEDURE — 85025 COMPLETE CBC W/AUTO DIFF WBC: CPT

## 2025-03-24 PROCEDURE — 87324 CLOSTRIDIUM AG IA: CPT

## 2025-03-24 PROCEDURE — 82533 TOTAL CORTISOL: CPT

## 2025-03-24 PROCEDURE — 84443 ASSAY THYROID STIM HORMONE: CPT

## 2025-03-24 PROCEDURE — 96365 THER/PROPH/DIAG IV INF INIT: CPT

## 2025-03-24 PROCEDURE — 36415 COLL VENOUS BLD VENIPUNCTURE: CPT

## 2025-03-24 PROCEDURE — 71045 X-RAY EXAM CHEST 1 VIEW: CPT

## 2025-03-24 PROCEDURE — 87086 URINE CULTURE/COLONY COUNT: CPT

## 2025-03-24 PROCEDURE — 85610 PROTHROMBIN TIME: CPT

## 2025-03-24 PROCEDURE — 85652 RBC SED RATE AUTOMATED: CPT

## 2025-03-24 PROCEDURE — 93005 ELECTROCARDIOGRAM TRACING: CPT

## 2025-03-24 PROCEDURE — 87040 BLOOD CULTURE FOR BACTERIA: CPT

## 2025-03-24 PROCEDURE — 80053 COMPREHEN METABOLIC PANEL: CPT

## 2025-03-24 PROCEDURE — 82962 GLUCOSE BLOOD TEST: CPT

## 2025-03-24 PROCEDURE — 84100 ASSAY OF PHOSPHORUS: CPT

## 2025-03-24 PROCEDURE — 99232 SBSQ HOSP IP/OBS MODERATE 35: CPT

## 2025-03-24 PROCEDURE — 80048 BASIC METABOLIC PNL TOTAL CA: CPT

## 2025-03-24 PROCEDURE — 84449 ASSAY OF TRANSCORTIN: CPT

## 2025-03-24 PROCEDURE — 87449 NOS EACH ORGANISM AG IA: CPT

## 2025-03-24 PROCEDURE — 87637 SARSCOV2&INF A&B&RSV AMP PRB: CPT

## 2025-03-24 PROCEDURE — 80076 HEPATIC FUNCTION PANEL: CPT

## 2025-03-24 PROCEDURE — 96376 TX/PRO/DX INJ SAME DRUG ADON: CPT

## 2025-03-24 PROCEDURE — 83735 ASSAY OF MAGNESIUM: CPT

## 2025-03-24 PROCEDURE — 85730 THROMBOPLASTIN TIME PARTIAL: CPT

## 2025-03-24 PROCEDURE — 82610 CYSTATIN C: CPT

## 2025-03-24 PROCEDURE — 81001 URINALYSIS AUTO W/SCOPE: CPT

## 2025-03-24 PROCEDURE — 86140 C-REACTIVE PROTEIN: CPT

## 2025-03-24 PROCEDURE — 74176 CT ABD & PELVIS W/O CONTRAST: CPT | Mod: MC

## 2025-03-24 PROCEDURE — 96375 TX/PRO/DX INJ NEW DRUG ADDON: CPT

## 2025-03-24 RX ORDER — METRONIDAZOLE 250 MG
1 TABLET ORAL
Qty: 30 | Refills: 0
Start: 2025-03-24 | End: 2025-04-02

## 2025-03-24 RX ORDER — METRONIDAZOLE 250 MG
750 TABLET ORAL EVERY 8 HOURS
Refills: 0 | Status: DISCONTINUED | OUTPATIENT
Start: 2025-03-24 | End: 2025-03-24

## 2025-03-24 RX ORDER — MAGNESIUM SULFATE 500 MG/ML
1 SYRINGE (ML) INJECTION ONCE
Refills: 0 | Status: COMPLETED | OUTPATIENT
Start: 2025-03-24 | End: 2025-03-24

## 2025-03-24 RX ORDER — HYPROMELLOSE 0.4 %
1 DROPS OPHTHALMIC (EYE)
Qty: 0 | Refills: 0 | DISCHARGE
Start: 2025-03-24

## 2025-03-24 RX ADMIN — Medication 40 MILLIEQUIVALENT(S): at 07:37

## 2025-03-24 RX ADMIN — Medication 1 APPLICATION(S): at 06:07

## 2025-03-24 RX ADMIN — Medication 100 GRAM(S): at 07:37

## 2025-03-24 RX ADMIN — Medication 160 MILLIGRAM(S): at 06:07

## 2025-03-24 RX ADMIN — HEPARIN SODIUM 5000 UNIT(S): 1000 INJECTION INTRAVENOUS; SUBCUTANEOUS at 06:07

## 2025-03-24 RX ADMIN — Medication 40 MILLIEQUIVALENT(S): at 10:36

## 2025-03-24 NOTE — DISCHARGE NOTE PROVIDER - NSDCFUADDINST_GEN_ALL_CORE_FT
Please take Flagyl as instructed for 10 days.    Please take Tylenol 1000mg every 6 hours for pain. DO NOT exceed a max daily dose of 4 grams of tylenol. You may alternate every 3 hours with ibuprofen as needed.    General Discharge Instructions:  Please resume all regular home medications unless specifically advised not to take a particular medication. Also, please take any new medications as prescribed.  Please get plenty of rest, continue to ambulate several times per day, and drink adequate amounts of fluids. Avoid lifting weights greater than 5-10 lbs until you follow-up with your surgeon, who will instruct you further regarding activity restrictions.  Avoid driving or operating heavy machinery while taking pain medications.  Please follow-up with your surgeon and Primary Care Provider (PCP) as advised.  Incision Care:  *Please call your doctor or nurse practitioner if you have increased pain, swelling, redness, or drainage from the incision site.  *Avoid swimming and baths until your follow-up appointment.  *You may shower, and wash surgical incisions with a mild soap and warm water. Gently pat the area dry.  *If you have staples, they will be removed at your follow-up appointment.  *If you have steri-strips, they will fall off on their own. Please remove any remaining strips 7-10 days after surgery.    Warning Signs:  Please call your doctor or nurse practitioner if you experience the following:  *You experience new chest pain, pressure, squeezing or tightness.  *New or worsening cough, shortness of breath, or wheeze.  *If you are vomiting and cannot keep down fluids or your medications.  *You are getting dehydrated due to continued vomiting, diarrhea, or other reasons. Signs of dehydration include dry mouth, rapid heartbeat, or feeling dizzy or faint when standing.  *You see blood or dark/black material when you vomit or have a bowel movement.  *You experience burning when you urinate, have blood in your urine, or experience a discharge.  *Your pain is not improving within 8-12 hours or is not gone within 24 hours. Call or return immediately if your pain is getting worse, changes location, or moves to your chest or back.  *You have shaking chills, or fever greater than 101.5 degrees Fahrenheit or 38 degrees Celsius.  *Any change in your symptoms, or any new symptoms that concern you.

## 2025-03-24 NOTE — DISCHARGE NOTE PROVIDER - NSDCCPCAREPLAN_GEN_ALL_CORE_FT
PRINCIPAL DISCHARGE DIAGNOSIS  Diagnosis: Colitis  Assessment and Plan of Treatment:       SECONDARY DISCHARGE DIAGNOSES  Diagnosis: YOLI (acute kidney injury)  Assessment and Plan of Treatment:     Diagnosis: Hypokalemia  Assessment and Plan of Treatment:     Diagnosis: Hypovolemia dehydration  Assessment and Plan of Treatment:

## 2025-03-24 NOTE — PROGRESS NOTE ADULT - ASSESSMENT
70 yo M PMH DM, HTN, hypothyroidism, presented to the ED on 3/18 c/o 2 weeks of voluminous watery diarrhea, discharged same day on Augmentin Represents 3/21 w/ CT findings of pancolitis and transverse colon of 8cm. Negative prior infectious work up, less likelihood of ischemic and and possible inflammatory colitis. Given hypotension on presentation, on-going tachycardia, plan to admit patient to SICU for HD monitoring and close monitoring given electrolyte derangements, YOLI and ongoing fluid resuscitation. Since then patient YOLI largely improved and patient with return of bowel function.  Per GI most likely in setting of E. Histolytica infection    Adv to Reg diet  SDU

## 2025-03-24 NOTE — DISCHARGE NOTE PROVIDER - CARE PROVIDER_API CALL
Missael Reynaga  Surgery  [address], [address]  [place] [address]  Phone: [phone]  Fax: [phone]  Follow Up Time:

## 2025-03-24 NOTE — PROGRESS NOTE ADULT - ATTENDING COMMENTS
68 yo M w/ hx of steroid induced DM now resolved, hypothyroidism, HTN, p/w abdominal pain and diarrhea, leukocytosis, CT w/ pancolitis, c diff and GI PCR negative, d/c home with abx and returned to ED with same symptoms plus dizzy/lethargic, repeat CT with severe dilation of transverse colon to 8cm, new YOLI on CKD, hypovolemic, hypotensive, responded to IVF, admitted to SICU. Plan was for possible flex sig given unknown etiology - after discussion with team, no flex sig for now to avoid risk of perf and inflammatory etiology unlikely based on presentation. Adjusted dose of flagyl to treat enteomeba histolytica as this was only indeterminant infectious work up. Afebrile overnight, HR 70-90s, BP again labile can range SBP 80-120s but remains warm and well perfused. I/O 4.1/2.8 + 1.3, UOP 1275 and on LR @120/hr, stool output 1525. WBC improved, H/H stable, persistent hypokalemia s/p repletion, renal function improved. Continue cef/flagyl. Plan for flex sig today to assess for inflammatory vs infectious etiology. D/c vanc as c diff neg again, c/w ceftriaxone and flagyl. Trend ESR/CRP. On testosterone, anastrazole, and Mounjaro. Cortisol AM normal. Check orthostatics today. On clear liquid diet.
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     Patient with up to 20BMs per day. Stool studies negative for Cdiff, GI PCR negative aside from indeterminate E. histolytica finding. Colitis noted on CT, CRP peaked 253.9. Patient reports significant improvement in symptoms, now down to 3BM/day. Tolerating diet. Recommend completing course of antibiotics for entamoeba histolytica (metronidazole for 10 days followed by weight based paromomycin to eliminate intraluminal cysts for 5-10 days) . Trend CRP. Recommend colonoscopy on an outpatient setting pending healed infection as this may confound biopsy results for chronic inflammatory bowel disease.    Agree with plan as outlined above.    MINDA Ace MD  GI Attending
70 yo M w/ hx of steroid induced DM now resolved, hypothyroidism, HTN, p/w abdominal pain and diarrhea, leukocytosis, CT w/ pancolitis, c diff and GI PCR negative, d/c home with abx and returned to ED with same symptoms plus dizzy/lethargic, repeat CT with severe dilation of transverse colon to 8cm, new YOLI on CKD, hypovolemic, hypotensive, responded to IVF, admitted to SICU. BP very labile, but normal lactate, CTA without ischemic changes. Afebrile, HR 80-100s, BP /50-80s, SpO2 95% on 2L NC (88% on RA), In 8L from ED and SICU, UOP overnight only 500cc. 1 BM today, c diff again neg. Mild hyponatremia stable, hypokalemia improved. Cr 1.4 from 1.8 on admission, Granular and hyaline casts on UA. Restarted tamsulosin, refused ramires. TSH WNL. Plan for flex sig today to assess for inflammatory vs infectious etiology. D/c vanc as c diff neg again, c/w ceftriaxone and flagyl. Trend ESR/CRP. On testosterone, anastrazole, and Mounjaro. Will assess for adrenal insufficiency with AM cortisol. NPO for flex sig. On IVF 120cc/hr.
Greatly improved on increased flagyl.  OK to advance diet.  Hopefully home soon.  thanks
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
## 2025-03-24 NOTE — DISCHARGE NOTE NURSING/CASE MANAGEMENT/SOCIAL WORK - NSDCPEFALRISK_GEN_ALL_CORE
For information on Fall & Injury Prevention, visit: [email or web address] OR  [email or web address] OR  https://www.cdc.gov/steadi/patient.html
done

## 2025-03-24 NOTE — DISCHARGE NOTE PROVIDER - NSDCMRMEDTOKEN_GEN_ALL_CORE_FT
anastrozole 1 mg oral tablet: 0.5 tab(s) orally once a week  olmesartan 5 mg oral tablet: 2 tab(s) orally  rosuvastatin: 5 milligram(s)  tamsulosin 0.4 mg oral capsule: 1 cap(s) orally  testosterone 50 mg/mL intramuscular solution: 60 milligram(s) intramuscular  tirzepatide: 15 milligram(s)   anastrozole 1 mg oral tablet: 0.5 tab(s) orally once a week  cetirizine 5 mg oral tablet: 1 tab(s) orally once a day  metroNIDAZOLE 500 mg oral tablet: 1 tab(s) orally every 8 hours  ocular lubricant preservative-free ophthalmic solution: 1 drop(s) to each affected eye every 6 hours As needed Dry Eyes  olmesartan 5 mg oral tablet: 2 tab(s) orally  rosuvastatin: 5 milligram(s)  tamsulosin 0.4 mg oral capsule: 1 cap(s) orally  testosterone 50 mg/mL intramuscular solution: 60 milligram(s) intramuscular  tirzepatide: 15 milligram(s)

## 2025-03-24 NOTE — PROGRESS NOTE ADULT - SUBJECTIVE AND OBJECTIVE BOX
GASTROENTEROLOGY PROGRESS NOTE    INTERVAL/SUBJECTIVE:  Patient with decreased bowel movement frequency overnight. Describes 2-4 bowel movements that were brown/green and semi formed. Denies abdominal pain. Tolerated solid food well.     Allergies    No Known Allergies    Intolerances      MEDICATIONS:  MEDICATIONS  (STANDING):  [place] Thyroid 180 milliGRAM(s) 1 Tablet(s) Oral daily  cefTRIAXone   IVPB 2000 milliGRAM(s) IV Intermittent every 24 hours  cetirizine 5 milliGRAM(s) Oral daily  chlorhexidine 2% Cloths 1 Application(s) Topical <User Schedule>  dextrose 5%. 1000 milliLiter(s) (100 mL/Hr) IV Continuous <Continuous>  dextrose 50% Injectable 25 Gram(s) IV Push once  heparin   Injectable 5000 Unit(s) SubCutaneous every 8 hours  insulin lispro (ADMELOG) corrective regimen sliding scale   SubCutaneous three times a day before meals  metroNIDAZOLE  IVPB 750 milliGRAM(s) IV Intermittent every 8 hours  tamsulosin 0.4 milliGRAM(s) Oral at bedtime    MEDICATIONS  (PRN):  artificial tears (preservative free) Ophthalmic Solution 1 Drop(s) Both EYES every 6 hours PRN Dry Eyes  Brimonidine 0.025% 1 Drop(s) 1 Drop(s) Both EYES every 6 hours PRN dry eyes and/or redness  ondansetron Injectable 4 milliGRAM(s) IV Push every 6 hours PRN Nausea and/or Vomiting    Vital Signs Last 24 Hrs  T(C): 36.8 (24 Mar 2025 13:02), Max: 36.8 (24 Mar 2025 09:00)  T(F): 98.3 (24 Mar 2025 13:02), Max: 98.3 (24 Mar 2025 09:00)  HR: 90 (24 Mar 2025 12:35) (74 - 102)  BP: 119/85 (24 Mar 2025 12:35) (95/53 - 135/78)  BP(mean): 96 (24 Mar 2025 12:35) (66 - 97)  RR: 18 (24 Mar 2025 12:35) (15 - 29)  SpO2: 98% (24 Mar 2025 12:35) (93% - 98%)    Parameters below as of 24 Mar 2025 12:35  Patient On (Oxygen Delivery Method): room air        03-23 @ 07:01  -  03-24 @ 07:00  --------------------------------------------------------  IN: 2080 mL / OUT: 1050 mL / NET: 1030 mL    03-24 @ 07:01  - 03-24 @ 13:03  --------------------------------------------------------  IN: 150 mL / OUT: 0 mL / NET: 150 mL      General: Elderly male, sitting in chair, well developed, well nourished, in no acute distress  Eyes: Anicteric sclerae, moist conjunctivae, extraocular motions intact, pupils equal round and reactive to light  HENT: Pink and moist mucous membranes, good dentition, tongue normal in appearance without lesions and has symmetrical movement, no obvious oral lesions noted, pharynx normal without tonsillar swelling or exudates  Abdomen: Symmetric appearing, no obvious lesions, non-distended, normal bowel sounds, soft, non-tender to palpation, no rebound or guarding  Skin: Warm and dry, no obvious rash, no jaundice      LABS:                        12.1   6.81  )-----------( 489      ( 24 Mar 2025 05:19 )             32.5     03-24    137  |  112[H]  |  8   ----------------------------<  94  3.3[L]   |  18[L]  |  1.13    Ca    7.7[L]      24 Mar 2025 05:19  Phos  2.9     03-24  Mg     1.8     03-24          RADIOLOGY & ADDITIONAL STUDIES: Reviewed

## 2025-03-24 NOTE — PROGRESS NOTE ADULT - SUBJECTIVE AND OBJECTIVE BOX
INTERVAL HPI/OVERNIGHT EVENTS: Per GI: Given impr in sxs after abx therapy known to cover protozoan organisms, possible patient with protozoan infection. MCKAY.     SUBJECTIVE: Patient seen and examined at bedside with SICU attending. Patient without any acute issues overnight, he denies abdominal pain, nausea, vomiting. He states that he last had a bowel movement earlier this morning that was watery in character and without blood.      cefTRIAXone   IVPB 2000 milliGRAM(s) IV Intermittent every 24 hours  heparin   Injectable 5000 Unit(s) SubCutaneous every 8 hours  metroNIDAZOLE  IVPB 750 milliGRAM(s) IV Intermittent every 8 hours      Vital Signs Last 24 Hrs  T(C): 36.6 (24 Mar 2025 10:09), Max: 36.8 (24 Mar 2025 09:00)  T(F): 97.9 (24 Mar 2025 10:09), Max: 98.3 (24 Mar 2025 09:00)  HR: 84 (24 Mar 2025 09:35) (74 - 102)  BP: 127/65 (24 Mar 2025 09:35) (95/53 - 135/78)  BP(mean): 87 (24 Mar 2025 09:35) (66 - 97)  RR: 18 (24 Mar 2025 09:35) (15 - 29)  SpO2: 97% (24 Mar 2025 09:35) (93% - 98%)    Parameters below as of 24 Mar 2025 09:35  Patient On (Oxygen Delivery Method): room air      I&O's Detail    23 Mar 2025 07:01  -  24 Mar 2025 07:00  --------------------------------------------------------  IN:    IV PiggyBack: 640 mL    Lactated Ringers: 720 mL    Oral Fluid: 720 mL  Total IN: 2080 mL    OUT:    Voided (mL): 1050 mL  Total OUT: 1050 mL    Total NET: 1030 mL      24 Mar 2025 07:01  -  24 Mar 2025 11:27  --------------------------------------------------------  IN:    Oral Fluid: 150 mL  Total IN: 150 mL    OUT:  Total OUT: 0 mL    Total NET: 150 mL      T(C): 36.6 (03-24-25 @ 10:09), Max: 36.8 (03-24-25 @ 09:00)  HR: 84 (03-24-25 @ 09:35) (74 - 102)  BP: 127/65 (03-24-25 @ 09:35) (95/53 - 135/78)  RR: 18 (03-24-25 @ 09:35) (15 - 29)  SpO2: 97% (03-24-25 @ 09:35) (93% - 98%)    CONSTITUTIONAL: no apparent distress  EYES: PERRLA and symmetric  ENMT: Oral mucosa with moist membrane  RESP: No respiratory distress, no use of accessory muscles, CTA b/l  CV: RRR, +S1S2  GI: Soft, NT, ND, no rebound, no guarding  SKIN: No rashes or ulcers noted; no subcutaneous nodules or induration palpable  NEURO: CN II-XII intact  PSYCH: Appropriate insight/judgment    LABS:                        12.1   6.81  )-----------( 489      ( 24 Mar 2025 05:19 )             32.5     03-24    137  |  112[H]  |  8   ----------------------------<  94  3.3[L]   |  18[L]  |  1.13    Ca    7.7[L]      24 Mar 2025 05:19  Phos  2.9     03-24  Mg     1.8     03-24        Urinalysis Basic - ( 24 Mar 2025 05:19 )    Color: x / Appearance: x / SG: x / pH: x  Gluc: 94 mg/dL / Ketone: x  / Bili: x / Urobili: x   Blood: x / Protein: x / Nitrite: x   Leuk Esterase: x / RBC: x / WBC x   Sq Epi: x / Non Sq Epi: x / Bacteria: x        RADIOLOGY & ADDITIONAL STUDIES:

## 2025-03-24 NOTE — PROGRESS NOTE ADULT - ASSESSMENT
69M, HTN, DM, hypothyroidism, initially seen on 3/18 reporting 2 weeks of watery diarrhea, had CT scan demonstrating pancolitis w/ elevated white count to 19, negative GI PCR/C diff, Cr 1.3, discharged home w/ Percocet and Augmentin. Re-presented on 3/21 reporting continued episodes of wartery diarrhea w/ some blood streaking, dizziness, lethargymIn the ED, pt was initially hypotensive to 60s, recieved a total of 5L Crystalloid, had a CT scan demonstrating worsening colonic distension, pancolitis w/ transverse colon measuring 7-8 cm. Labs demonstrated YOLI of 1.39, hypokalemia, LA 1.6, received 5L crystalloid, PO vanc, IV CTX/flagyl, CTA obtained to r/o mesenteric ischemia. Pt admitted to SICU for hypotension, electrolyte repletions, YOLI now pending transfer to stepdown unit      NEURO: IV Tylenol RTC; Zofran PRN; Cetrizine 5mg qd for allergies  HEENT: Lumify Eye drops q6 hrs prn  CV: MAPs >65; HTN Olmesartan 5mg (on hold); HLD: Rosuvastatin (hold)  PULM: Saturating well on RA, IS/OOB. Has required 2L NC while asleep  GI/FEN: Regular diet; Colitis of unknown origin - transverse colon 8cm, GI PCR indeterminate entamoeba histolytica treating empirically  : Voiding freely. Strict I&Os; Improving YOLI (CKD 1.3 <1.8) on admission; BPH - Cont Tamsulosin 0.4mg   ENDO: ISS; On Mounjaro (last dose 3/15/25); Testosterone 60mg IM (last dose 3/18) and Anastrazole (prevents peripheral conversion testosterone to estrogen) last dose 1 week ago. AM Cortisol wnl. Cont [place] Thyroid 180 qd.   ID:  Entamoeba histolytica indeterminate; CFTX 2g q24(3/21- ); Flagyl 750 q8 (3/24---); //DC'd: flagyl 800 q6 (3/22-3/24), Flagyl 500 q8 (3/21-3/22), PO Vanc stopped (3/21-3/22), C diff negative; -ve Cryptosporidium   PPX: SCDs; SQH   LINES: PIVs,   WOUNDS/DRAINS: none   DISPO: SDU

## 2025-03-24 NOTE — PROGRESS NOTE ADULT - SUBJECTIVE AND OBJECTIVE BOX
INTERVAL HPI/OVERNIGHT EVENTS:  Per GI: Given impr in sxs after abx therapy known to cover protozoan organisms, possible patient with protozoan infection. MCKAY.     SUBJECTIVE: Patient expressing wish to eat and leave ICU. +stool overnight. Denies abd pain/n/v.       MEDICATIONS  (STANDING):  [place] Thyroid 180 milliGRAM(s) 1 Tablet(s) Oral daily  cefTRIAXone   IVPB 2000 milliGRAM(s) IV Intermittent every 24 hours  cetirizine 5 milliGRAM(s) Oral daily  chlorhexidine 2% Cloths 1 Application(s) Topical <User Schedule>  dextrose 5%. 1000 milliLiter(s) (100 mL/Hr) IV Continuous <Continuous>  dextrose 50% Injectable 25 Gram(s) IV Push once  heparin   Injectable 5000 Unit(s) SubCutaneous every 8 hours  insulin lispro (ADMELOG) corrective regimen sliding scale   SubCutaneous Before meals and at bedtime  metroNIDAZOLE  IVPB 750 milliGRAM(s) IV Intermittent every 8 hours  tamsulosin 0.4 milliGRAM(s) Oral at bedtime    MEDICATIONS  (PRN):  artificial tears (preservative free) Ophthalmic Solution 1 Drop(s) Both EYES every 6 hours PRN Dry Eyes  Brimonidine 0.025% 1 Drop(s) 1 Drop(s) Both EYES every 6 hours PRN dry eyes and/or redness  ondansetron Injectable 4 milliGRAM(s) IV Push every 6 hours PRN Nausea and/or Vomiting      Vital Signs Last 24 Hrs  T(C): 36.8 (24 Mar 2025 13:02), Max: 36.8 (24 Mar 2025 09:00)  T(F): 98.3 (24 Mar 2025 13:02), Max: 98.3 (24 Mar 2025 09:00)  HR: 90 (24 Mar 2025 12:35) (74 - 102)  BP: 119/85 (24 Mar 2025 12:35) (95/53 - 135/78)  BP(mean): 96 (24 Mar 2025 12:35) (66 - 97)  RR: 18 (24 Mar 2025 12:35) (15 - 29)  SpO2: 98% (24 Mar 2025 12:35) (93% - 98%)    Parameters below as of 24 Mar 2025 12:35  Patient On (Oxygen Delivery Method): room air        PHYSICAL EXAM:  Constitutional: A&Ox3, NAD  Respiratory: normal work of breathing  Cardiovascular: NSR, RRR  Abdomen: soft, non-tender, non distended, no rebound or guarding  Legs: WWP, SCDs in place, no calf tenderness                I&O's Detail    23 Mar 2025 07:01  -  24 Mar 2025 07:00  --------------------------------------------------------  IN:    IV PiggyBack: 640 mL    Lactated Ringers: 720 mL    Oral Fluid: 720 mL  Total IN: 2080 mL    OUT:    Voided (mL): 1050 mL  Total OUT: 1050 mL    Total NET: 1030 mL      24 Mar 2025 07:01  -  24 Mar 2025 13:15  --------------------------------------------------------  IN:    Oral Fluid: 150 mL  Total IN: 150 mL    OUT:    Voided (mL): 150 mL  Total OUT: 150 mL    Total NET: 0 mL          LABS:                        12.1   6.81  )-----------( 489      ( 24 Mar 2025 05:19 )             32.5     03-24    137  |  112[H]  |  8   ----------------------------<  94  3.3[L]   |  18[L]  |  1.13    Ca    7.7[L]      24 Mar 2025 05:19  Phos  2.9     03-24  Mg     1.8     03-24        Urinalysis Basic - ( 24 Mar 2025 05:19 )    Color: x / Appearance: x / SG: x / pH: x  Gluc: 94 mg/dL / Ketone: x  / Bili: x / Urobili: x   Blood: x / Protein: x / Nitrite: x   Leuk Esterase: x / RBC: x / WBC x   Sq Epi: x / Non Sq Epi: x / Bacteria: x

## 2025-03-24 NOTE — DISCHARGE NOTE NURSING/CASE MANAGEMENT/SOCIAL WORK - PATIENT PORTAL LINK FT
You can access the FollowMyHealth Patient Portal offered by [facility] by registering at the following website: [email or web address]. By joining [email or web address]’s FollowMyHealth portal, you will also be able to view your health information using other applications (apps) compatible with our system.

## 2025-03-24 NOTE — DISCHARGE NOTE PROVIDER - NSDCFUADDAPPT_GEN_ALL_CORE_FT
Please schedule a follow-up appointment with Dr. Reynaga within 1-2 weeks of discharge from the hospital. You may reach his office at [phone] at your earliest convenience.   Please schedule a follow-up appointment with Dr. Reynaga within 1-2 weeks of discharge from the hospital. You may reach his office at [phone] at your earliest convenience.      Will follow up with his outpatient gastroenterologist Dr. Fredo Early, who is aware of patient's discharge recommendations and hospitalization.

## 2025-03-24 NOTE — PROGRESS NOTE ADULT - ASSESSMENT
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 Patient describes watery diarrhea development approximately 2 weeks prior to this presentation. States the diarrhea is "watery and yellow" without obvious solid or semiformed brown stool. States this occurs between 12-20 times per day.     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 Will require paromomycin 25-30mg/kg q24 hours (divided into 3 times daily dosing) for 7 days as well as metronidazole 500mg q8 hours for 10 days total  - Can advance diet as tolerated  - No obvious contraindication for discharge today. Will follow up with his outpatient gastroenterologist Dr. Fredo Early, who is aware of patient's discharge recommendations and hospitalization.    Will sign off for now. Please reconsult if needed.    Heber Courtney,   Gastroenterology Fellow  GI Consult Pager Weekdays 7am-5pm: [phone]  Weeknights/Weekend/Holiday Coverage: Please Call the  for Contact Information  Follow Up Questions Welcome via [place] [employer] Teams Messenger

## 2025-03-24 NOTE — DISCHARGE NOTE NURSING/CASE MANAGEMENT/SOCIAL WORK - NSDCFUADDAPPT_GEN_ALL_CORE_FT
Please schedule a follow-up appointment with Dr. Reynaga within 1-2 weeks of discharge from the hospital. You may reach his office at [phone] at your earliest convenience.      Will follow up with his outpatient gastroenterologist Dr. Fredo Early, who is aware of patient's discharge recommendations and hospitalization.

## 2025-03-24 NOTE — DISCHARGE NOTE NURSING/CASE MANAGEMENT/SOCIAL WORK - FINANCIAL ASSISTANCE
[facility] provides services at a reduced cost to those who are determined to be eligible through [facility]’s financial assistance program. Information regarding [facility]’s financial assistance program can be found by going to [email or web address] or by calling [phone].

## 2025-03-26 LAB
CULTURE RESULTS: SIGNIFICANT CHANGE UP
CULTURE RESULTS: SIGNIFICANT CHANGE UP
SPECIMEN SOURCE: SIGNIFICANT CHANGE UP
SPECIMEN SOURCE: SIGNIFICANT CHANGE UP

## 2025-03-29 DIAGNOSIS — A08.8 OTHER SPECIFIED INTESTINAL INFECTIONS: ICD-10-CM

## 2025-03-29 DIAGNOSIS — N18.9 CHRONIC KIDNEY DISEASE, UNSPECIFIED: ICD-10-CM

## 2025-03-29 DIAGNOSIS — D72.829 ELEVATED WHITE BLOOD CELL COUNT, UNSPECIFIED: ICD-10-CM

## 2025-03-29 DIAGNOSIS — I95.9 HYPOTENSION, UNSPECIFIED: ICD-10-CM

## 2025-03-29 DIAGNOSIS — E86.1 HYPOVOLEMIA: ICD-10-CM

## 2025-03-29 DIAGNOSIS — I12.9 HYPERTENSIVE CHRONIC KIDNEY DISEASE WITH STAGE 1 THROUGH STAGE 4 CHRONIC KIDNEY DISEASE, OR UNSPECIFIED CHRONIC KIDNEY DISEASE: ICD-10-CM

## 2025-03-29 DIAGNOSIS — E86.0 DEHYDRATION: ICD-10-CM

## 2025-03-29 DIAGNOSIS — E87.6 HYPOKALEMIA: ICD-10-CM

## 2025-03-29 DIAGNOSIS — N17.9 ACUTE KIDNEY FAILURE, UNSPECIFIED: ICD-10-CM

## 2025-03-29 DIAGNOSIS — K52.9 NONINFECTIVE GASTROENTERITIS AND COLITIS, UNSPECIFIED: ICD-10-CM

## 2025-03-29 DIAGNOSIS — Q43.8 OTHER SPECIFIED CONGENITAL MALFORMATIONS OF INTESTINE: ICD-10-CM

## 2025-03-29 DIAGNOSIS — E03.9 HYPOTHYROIDISM, UNSPECIFIED: ICD-10-CM

## 2025-03-29 DIAGNOSIS — E87.1 HYPO-OSMOLALITY AND HYPONATREMIA: ICD-10-CM

## 2025-04-04 LAB
CORTICOSTEROID BINDING GLOBULIN RESULT: 0.6 MG/DL — LOW
CORTIS F/TOTAL MFR SERPL: 39 % — SIGNIFICANT CHANGE UP
CORTIS SERPL-MCNC: 5.9 UG/DL — SIGNIFICANT CHANGE UP
CORTISOL, FREE RESULT: 2.3 UG/DL — HIGH